# Patient Record
Sex: MALE | Race: WHITE | NOT HISPANIC OR LATINO | Employment: OTHER | URBAN - NONMETROPOLITAN AREA
[De-identification: names, ages, dates, MRNs, and addresses within clinical notes are randomized per-mention and may not be internally consistent; named-entity substitution may affect disease eponyms.]

---

## 2023-01-01 ENCOUNTER — APPOINTMENT (OUTPATIENT)
Dept: PHYSICAL THERAPY | Facility: OTHER | Age: 88
DRG: 193 | End: 2023-01-01
Payer: MEDICARE

## 2023-01-01 ENCOUNTER — APPOINTMENT (OUTPATIENT)
Dept: OCCUPATIONAL THERAPY | Facility: OTHER | Age: 88
DRG: 193 | End: 2023-01-01
Payer: MEDICARE

## 2023-01-01 ENCOUNTER — HOSPITAL ENCOUNTER (EMERGENCY)
Facility: OTHER | Age: 88
Discharge: HOME | End: 2023-12-07
Attending: PHYSICIAN ASSISTANT | Admitting: PHYSICIAN ASSISTANT
Payer: MEDICARE

## 2023-01-01 ENCOUNTER — APPOINTMENT (OUTPATIENT)
Dept: GENERAL RADIOLOGY | Facility: OTHER | Age: 88
DRG: 193 | End: 2023-01-01
Attending: EMERGENCY MEDICINE
Payer: MEDICARE

## 2023-01-01 ENCOUNTER — APPOINTMENT (OUTPATIENT)
Dept: OCCUPATIONAL THERAPY | Facility: OTHER | Age: 88
DRG: 193 | End: 2023-01-01
Attending: INTERNAL MEDICINE
Payer: MEDICARE

## 2023-01-01 ENCOUNTER — HOSPITAL ENCOUNTER (EMERGENCY)
Facility: OTHER | Age: 88
Discharge: NURSING, REHAB OR LONG-TERM CARE | End: 2023-12-11
Attending: INTERNAL MEDICINE | Admitting: INTERNAL MEDICINE
Payer: MEDICARE

## 2023-01-01 ENCOUNTER — HOSPITAL ENCOUNTER (INPATIENT)
Facility: OTHER | Age: 88
LOS: 5 days | Discharge: HOME HEALTH | DRG: 193 | End: 2023-12-30
Attending: STUDENT IN AN ORGANIZED HEALTH CARE EDUCATION/TRAINING PROGRAM | Admitting: INTERNAL MEDICINE
Payer: MEDICARE

## 2023-01-01 ENCOUNTER — APPOINTMENT (OUTPATIENT)
Dept: PHYSICAL THERAPY | Facility: OTHER | Age: 88
DRG: 193 | End: 2023-01-01
Attending: INTERNAL MEDICINE
Payer: MEDICARE

## 2023-01-01 ENCOUNTER — APPOINTMENT (OUTPATIENT)
Dept: CT IMAGING | Facility: OTHER | Age: 88
End: 2023-01-01
Attending: PHYSICIAN ASSISTANT
Payer: MEDICARE

## 2023-01-01 ENCOUNTER — APPOINTMENT (OUTPATIENT)
Dept: GENERAL RADIOLOGY | Facility: OTHER | Age: 88
DRG: 193 | End: 2023-01-01
Attending: INTERNAL MEDICINE
Payer: MEDICARE

## 2023-01-01 VITALS
SYSTOLIC BLOOD PRESSURE: 127 MMHG | TEMPERATURE: 98.7 F | OXYGEN SATURATION: 94 % | DIASTOLIC BLOOD PRESSURE: 65 MMHG | HEART RATE: 64 BPM | WEIGHT: 224 LBS | RESPIRATION RATE: 20 BRPM

## 2023-01-01 VITALS
SYSTOLIC BLOOD PRESSURE: 157 MMHG | OXYGEN SATURATION: 91 % | BODY MASS INDEX: 28.63 KG/M2 | HEIGHT: 68 IN | WEIGHT: 188.9 LBS | RESPIRATION RATE: 16 BRPM | HEART RATE: 65 BPM | TEMPERATURE: 97.8 F | DIASTOLIC BLOOD PRESSURE: 61 MMHG

## 2023-01-01 VITALS
DIASTOLIC BLOOD PRESSURE: 44 MMHG | RESPIRATION RATE: 18 BRPM | SYSTOLIC BLOOD PRESSURE: 115 MMHG | TEMPERATURE: 96 F | HEART RATE: 65 BPM | WEIGHT: 224 LBS | OXYGEN SATURATION: 97 %

## 2023-01-01 DIAGNOSIS — Z51.5 COMFORT MEASURES ONLY STATUS: ICD-10-CM

## 2023-01-01 DIAGNOSIS — I21.4 NSTEMI (NON-ST ELEVATED MYOCARDIAL INFARCTION) (H): ICD-10-CM

## 2023-01-01 DIAGNOSIS — S00.431A HEMATOMA OF RIGHT EAR, INITIAL ENCOUNTER: ICD-10-CM
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
## 2023-01-01 DIAGNOSIS — U07.1 COVID-19 VIRUS INFECTION: Primary | ICD-10-CM

## 2023-01-01 DIAGNOSIS — J18.9 PNEUMONIA OF RIGHT LOWER LOBE DUE TO INFECTIOUS ORGANISM: ICD-10-CM

## 2023-01-01 DIAGNOSIS — I48.20 CHRONIC ATRIAL FIBRILLATION (H): ICD-10-CM

## 2023-01-01 DIAGNOSIS — D64.9 ANEMIA, UNSPECIFIED TYPE: ICD-10-CM

## 2023-01-01 DIAGNOSIS — U07.1 COVID-19 VIRUS INFECTION: ICD-10-CM

## 2023-01-01 LAB
ABO/RH(D): NORMAL
ALBUMIN SERPL BCG-MCNC: 3 G/DL (ref 3.5–5.2)
ALBUMIN UR-MCNC: 30 MG/DL
ALP SERPL-CCNC: 83 U/L (ref 40–150)
ALT SERPL W P-5'-P-CCNC: 17 U/L (ref 0–70)
ANION GAP SERPL CALCULATED.3IONS-SCNC: 10 MMOL/L (ref 7–15)
ANION GAP SERPL CALCULATED.3IONS-SCNC: 10 MMOL/L (ref 7–15)
ANION GAP SERPL CALCULATED.3IONS-SCNC: 11 MMOL/L (ref 7–15)
ANION GAP SERPL CALCULATED.3IONS-SCNC: 11 MMOL/L (ref 7–15)
ANION GAP SERPL CALCULATED.3IONS-SCNC: 9 MMOL/L (ref 7–15)
ANION GAP SERPL CALCULATED.3IONS-SCNC: 9 MMOL/L (ref 7–15)
ANTIBODY SCREEN: NEGATIVE
APPEARANCE UR: ABNORMAL
AST SERPL W P-5'-P-CCNC: 32 U/L (ref 0–45)
ATRIAL RATE - MUSE: 76 BPM
BACTERIA UR CULT: NORMAL
BASE EXCESS BLDV CALC-SCNC: 2.1 MMOL/L (ref -7.7–1.9)
BASOPHILS # BLD AUTO: 0 10E3/UL (ref 0–0.2)
BASOPHILS # BLD AUTO: 0 10E3/UL (ref 0–0.2)
BASOPHILS NFR BLD AUTO: 0 %
BASOPHILS NFR BLD AUTO: 0 %
BILIRUB DIRECT SERPL-MCNC: <0.2 MG/DL (ref 0–0.3)
BILIRUB SERPL-MCNC: 0.5 MG/DL
BILIRUB UR QL STRIP: NEGATIVE
BLD PROD TYP BPU: NORMAL
BLOOD COMPONENT TYPE: NORMAL
BUN SERPL-MCNC: 25 MG/DL (ref 8–23)
BUN SERPL-MCNC: 29.4 MG/DL (ref 8–23)
BUN SERPL-MCNC: 32.6 MG/DL (ref 8–23)
BUN SERPL-MCNC: 33.1 MG/DL (ref 8–23)
BUN SERPL-MCNC: 34 MG/DL (ref 8–23)
BUN SERPL-MCNC: 36.7 MG/DL (ref 8–23)
CALCIUM SERPL-MCNC: 7.3 MG/DL (ref 8.2–9.6)
CALCIUM SERPL-MCNC: 7.8 MG/DL (ref 8.2–9.6)
CALCIUM SERPL-MCNC: 7.9 MG/DL (ref 8.2–9.6)
CALCIUM SERPL-MCNC: 8.1 MG/DL (ref 8.2–9.6)
CALCIUM SERPL-MCNC: 8.2 MG/DL (ref 8.2–9.6)
CALCIUM SERPL-MCNC: 8.6 MG/DL (ref 8.2–9.6)
CHLORIDE SERPL-SCNC: 101 MMOL/L (ref 98–107)
CHLORIDE SERPL-SCNC: 101 MMOL/L (ref 98–107)
CHLORIDE SERPL-SCNC: 102 MMOL/L (ref 98–107)
CHLORIDE SERPL-SCNC: 103 MMOL/L (ref 98–107)
CHLORIDE SERPL-SCNC: 99 MMOL/L (ref 98–107)
CHLORIDE SERPL-SCNC: 99 MMOL/L (ref 98–107)
CODING SYSTEM: NORMAL
COLOR UR AUTO: YELLOW
CREAT SERPL-MCNC: 1.23 MG/DL (ref 0.67–1.17)
CREAT SERPL-MCNC: 1.31 MG/DL (ref 0.67–1.17)
CREAT SERPL-MCNC: 1.34 MG/DL (ref 0.67–1.17)
CREAT SERPL-MCNC: 1.38 MG/DL (ref 0.67–1.17)
CREAT SERPL-MCNC: 1.4 MG/DL (ref 0.67–1.17)
CREAT SERPL-MCNC: 1.59 MG/DL (ref 0.67–1.17)
CROSSMATCH: NORMAL
CRP SERPL-MCNC: 121.08 MG/L
CRP SERPL-MCNC: 28.45 MG/L
CRP SERPL-MCNC: 45.93 MG/L
CRP SERPL-MCNC: 95.09 MG/L
DEPRECATED HCO3 PLAS-SCNC: 26 MMOL/L (ref 22–29)
DEPRECATED HCO3 PLAS-SCNC: 28 MMOL/L (ref 22–29)
DEPRECATED HCO3 PLAS-SCNC: 28 MMOL/L (ref 22–29)
DIASTOLIC BLOOD PRESSURE - MUSE: NORMAL MMHG
EGFRCR SERPLBLD CKD-EPI 2021: 41 ML/MIN/1.73M2
EGFRCR SERPLBLD CKD-EPI 2021: 47 ML/MIN/1.73M2
EGFRCR SERPLBLD CKD-EPI 2021: 48 ML/MIN/1.73M2
EGFRCR SERPLBLD CKD-EPI 2021: 50 ML/MIN/1.73M2
EGFRCR SERPLBLD CKD-EPI 2021: 51 ML/MIN/1.73M2
EGFRCR SERPLBLD CKD-EPI 2021: 55 ML/MIN/1.73M2
EOSINOPHIL # BLD AUTO: 0 10E3/UL (ref 0–0.7)
EOSINOPHIL # BLD AUTO: 0.3 10E3/UL (ref 0–0.7)
EOSINOPHIL NFR BLD AUTO: 0 %
EOSINOPHIL NFR BLD AUTO: 2 %
ERYTHROCYTE [DISTWIDTH] IN BLOOD BY AUTOMATED COUNT: 17.1 % (ref 10–15)
ERYTHROCYTE [DISTWIDTH] IN BLOOD BY AUTOMATED COUNT: 17.2 % (ref 10–15)
ERYTHROCYTE [DISTWIDTH] IN BLOOD BY AUTOMATED COUNT: 17.5 % (ref 10–15)
ERYTHROCYTE [DISTWIDTH] IN BLOOD BY AUTOMATED COUNT: 18.4 % (ref 10–15)
FERRITIN SERPL-MCNC: 53 NG/ML (ref 31–409)
GLUCOSE BLDC GLUCOMTR-MCNC: 100 MG/DL (ref 70–99)
GLUCOSE BLDC GLUCOMTR-MCNC: 126 MG/DL (ref 70–99)
GLUCOSE BLDC GLUCOMTR-MCNC: 126 MG/DL (ref 70–99)
GLUCOSE BLDC GLUCOMTR-MCNC: 148 MG/DL (ref 70–99)
GLUCOSE BLDC GLUCOMTR-MCNC: 153 MG/DL (ref 70–99)
GLUCOSE BLDC GLUCOMTR-MCNC: 154 MG/DL (ref 70–99)
GLUCOSE BLDC GLUCOMTR-MCNC: 159 MG/DL (ref 70–99)
GLUCOSE BLDC GLUCOMTR-MCNC: 167 MG/DL (ref 70–99)
GLUCOSE BLDC GLUCOMTR-MCNC: 168 MG/DL (ref 70–99)
GLUCOSE BLDC GLUCOMTR-MCNC: 176 MG/DL (ref 70–99)
GLUCOSE BLDC GLUCOMTR-MCNC: 182 MG/DL (ref 70–99)
GLUCOSE BLDC GLUCOMTR-MCNC: 202 MG/DL (ref 70–99)
GLUCOSE BLDC GLUCOMTR-MCNC: 217 MG/DL (ref 70–99)
GLUCOSE BLDC GLUCOMTR-MCNC: 272 MG/DL (ref 70–99)
GLUCOSE BLDC GLUCOMTR-MCNC: 318 MG/DL (ref 70–99)
GLUCOSE BLDC GLUCOMTR-MCNC: 79 MG/DL (ref 70–99)
GLUCOSE BLDC GLUCOMTR-MCNC: 80 MG/DL (ref 70–99)
GLUCOSE BLDC GLUCOMTR-MCNC: 82 MG/DL (ref 70–99)
GLUCOSE BLDC GLUCOMTR-MCNC: 86 MG/DL (ref 70–99)
GLUCOSE SERPL-MCNC: 165 MG/DL (ref 70–99)
GLUCOSE SERPL-MCNC: 189 MG/DL (ref 70–99)
GLUCOSE SERPL-MCNC: 192 MG/DL (ref 70–99)
GLUCOSE SERPL-MCNC: 319 MG/DL (ref 70–99)
GLUCOSE SERPL-MCNC: 92 MG/DL (ref 70–99)
GLUCOSE SERPL-MCNC: 93 MG/DL (ref 70–99)
GLUCOSE UR STRIP-MCNC: NEGATIVE MG/DL
HBA1C MFR BLD: 5.9 % (ref 4–6.2)
HCO3 BLDV-SCNC: 27 MMOL/L (ref 21–28)
HCT VFR BLD AUTO: 19.1 % (ref 40–53)
HCT VFR BLD AUTO: 25.4 % (ref 40–53)
HCT VFR BLD AUTO: 26 % (ref 40–53)
HCT VFR BLD AUTO: 26.2 % (ref 40–53)
HCT VFR BLD AUTO: 28.7 % (ref 40–53)
HEMOCCULT STL QL: POSITIVE
HGB BLD-MCNC: 5.6 G/DL (ref 13.3–17.7)
HGB BLD-MCNC: 7.7 G/DL (ref 13.3–17.7)
HGB BLD-MCNC: 7.7 G/DL (ref 13.3–17.7)
HGB BLD-MCNC: 7.9 G/DL (ref 13.3–17.7)
HGB BLD-MCNC: 8 G/DL (ref 13.3–17.7)
HGB BLD-MCNC: 8.3 G/DL (ref 13.3–17.7)
HGB BLD-MCNC: 8.4 G/DL (ref 13.3–17.7)
HGB BLD-MCNC: 8.5 G/DL (ref 13.3–17.7)
HGB BLD-MCNC: 8.7 G/DL (ref 13.3–17.7)
HGB BLD-MCNC: 8.8 G/DL (ref 13.3–17.7)
HGB UR QL STRIP: NEGATIVE
HOLD SPECIMEN: NORMAL
HYALINE CASTS: 1 /LPF
IMM GRANULOCYTES # BLD: 0.1 10E3/UL
IMM GRANULOCYTES # BLD: 0.1 10E3/UL
IMM GRANULOCYTES NFR BLD: 1 %
IMM GRANULOCYTES NFR BLD: 1 %
INR PPP: 1.69 (ref 0.85–1.15)
INTERPRETATION ECG - MUSE: NORMAL
IRON BINDING CAPACITY (ROCHE): 247 UG/DL (ref 240–430)
IRON SATN MFR SERPL: 6 % (ref 15–46)
IRON SERPL-MCNC: 16 UG/DL (ref 61–157)
ISSUE DATE AND TIME: NORMAL
KETONES UR STRIP-MCNC: NEGATIVE MG/DL
L PNEUMO1 AG UR QL IA: NEGATIVE
LACTATE SERPL-SCNC: 0.7 MMOL/L (ref 0.7–2)
LACTATE SERPL-SCNC: 1.1 MMOL/L (ref 0.7–2)
LEUKOCYTE ESTERASE UR QL STRIP: ABNORMAL
LYMPHOCYTES # BLD AUTO: 1.4 10E3/UL (ref 0.8–5.3)
LYMPHOCYTES # BLD AUTO: 2.2 10E3/UL (ref 0.8–5.3)
LYMPHOCYTES NFR BLD AUTO: 11 %
LYMPHOCYTES NFR BLD AUTO: 14 %
MCH RBC QN AUTO: 23.6 PG (ref 26.5–33)
MCH RBC QN AUTO: 24.7 PG (ref 26.5–33)
MCH RBC QN AUTO: 25 PG (ref 26.5–33)
MCH RBC QN AUTO: 25.2 PG (ref 26.5–33)
MCHC RBC AUTO-ENTMCNC: 29.3 G/DL (ref 31.5–36.5)
MCHC RBC AUTO-ENTMCNC: 29.6 G/DL (ref 31.5–36.5)
MCHC RBC AUTO-ENTMCNC: 30.7 G/DL (ref 31.5–36.5)
MCHC RBC AUTO-ENTMCNC: 31.1 G/DL (ref 31.5–36.5)
MCV RBC AUTO: 81 FL (ref 78–100)
MCV RBC AUTO: 81 FL (ref 78–100)
MCV RBC AUTO: 82 FL (ref 78–100)
MCV RBC AUTO: 83 FL (ref 78–100)
MONOCYTES # BLD AUTO: 1 10E3/UL (ref 0–1.3)
MONOCYTES # BLD AUTO: 1.9 10E3/UL (ref 0–1.3)
MONOCYTES NFR BLD AUTO: 12 %
MONOCYTES NFR BLD AUTO: 8 %
NEUTROPHILS # BLD AUTO: 10.7 10E3/UL (ref 1.6–8.3)
NEUTROPHILS # BLD AUTO: 11.2 10E3/UL (ref 1.6–8.3)
NEUTROPHILS NFR BLD AUTO: 71 %
NEUTROPHILS NFR BLD AUTO: 80 %
NITRATE UR QL: NEGATIVE
NRBC # BLD AUTO: 0.1 10E3/UL
NRBC # BLD AUTO: 0.1 10E3/UL
NRBC BLD AUTO-RTO: 0 /100
NRBC BLD AUTO-RTO: 0 /100
O2/TOTAL GAS SETTING VFR VENT: 28 %
P AXIS - MUSE: NORMAL DEGREES
PCO2 BLDV: 44 MM HG (ref 40–50)
PH BLDV: 7.4 [PH] (ref 7.32–7.43)
PH UR STRIP: 7.5 [PH] (ref 5–9)
PLATELET # BLD AUTO: 403 10E3/UL (ref 150–450)
PLATELET # BLD AUTO: 410 10E3/UL (ref 150–450)
PLATELET # BLD AUTO: 419 10E3/UL (ref 150–450)
PLATELET # BLD AUTO: 423 10E3/UL (ref 150–450)
PO2 BLDV: 99 MM HG (ref 25–47)
POTASSIUM SERPL-SCNC: 4.2 MMOL/L (ref 3.4–5.3)
POTASSIUM SERPL-SCNC: 4.3 MMOL/L (ref 3.4–5.3)
POTASSIUM SERPL-SCNC: 4.3 MMOL/L (ref 3.4–5.3)
POTASSIUM SERPL-SCNC: 4.5 MMOL/L (ref 3.4–5.3)
POTASSIUM SERPL-SCNC: 4.6 MMOL/L (ref 3.4–5.3)
POTASSIUM SERPL-SCNC: 5 MMOL/L (ref 3.4–5.3)
PR INTERVAL - MUSE: NORMAL MS
PROCALCITONIN SERPL IA-MCNC: 0.32 NG/ML
PROCALCITONIN SERPL IA-MCNC: 0.33 NG/ML
PROCALCITONIN SERPL IA-MCNC: 0.37 NG/ML
PROT SERPL-MCNC: 5.7 G/DL (ref 6.4–8.3)
QRS DURATION - MUSE: 100 MS
QT - MUSE: 420 MS
QTC - MUSE: 446 MS
R AXIS - MUSE: -2 DEGREES
RBC # BLD AUTO: 2.37 10E6/UL (ref 4.4–5.9)
RBC # BLD AUTO: 3.12 10E6/UL (ref 4.4–5.9)
RBC # BLD AUTO: 3.13 10E6/UL (ref 4.4–5.9)
RBC # BLD AUTO: 3.52 10E6/UL (ref 4.4–5.9)
RBC URINE: 2 /HPF
S PNEUM AG SPEC QL: NEGATIVE
SODIUM SERPL-SCNC: 136 MMOL/L (ref 135–145)
SODIUM SERPL-SCNC: 136 MMOL/L (ref 135–145)
SODIUM SERPL-SCNC: 137 MMOL/L (ref 135–145)
SODIUM SERPL-SCNC: 138 MMOL/L (ref 135–145)
SODIUM SERPL-SCNC: 139 MMOL/L (ref 135–145)
SODIUM SERPL-SCNC: 139 MMOL/L (ref 135–145)
SP GR UR STRIP: 1.02 (ref 1–1.03)
SPECIMEN EXPIRATION DATE: NORMAL
SYSTOLIC BLOOD PRESSURE - MUSE: NORMAL MMHG
T AXIS - MUSE: 20 DEGREES
TROPONIN T SERPL HS-MCNC: 67 NG/L
TROPONIN T SERPL HS-MCNC: 70 NG/L
TROPONIN T SERPL HS-MCNC: 70 NG/L
UNIT ABO/RH: NORMAL
UNIT NUMBER: NORMAL
UNIT STATUS: NORMAL
UNIT TYPE ISBT: 6200
UROBILINOGEN UR STRIP-MCNC: NORMAL MG/DL
VENTRICULAR RATE- MUSE: 68 BPM
WBC # BLD AUTO: 13.2 10E3/UL (ref 4–11)
WBC # BLD AUTO: 14.4 10E3/UL (ref 4–11)
WBC # BLD AUTO: 15.7 10E3/UL (ref 4–11)
WBC # BLD AUTO: 9.5 10E3/UL (ref 4–11)
WBC URINE: 136 /HPF
YEAST #/AREA URNS HPF: ABNORMAL /HPF

## 2023-01-01 PROCEDURE — 84145 PROCALCITONIN (PCT): CPT | Performed by: INTERNAL MEDICINE

## 2023-01-01 PROCEDURE — 80048 BASIC METABOLIC PNL TOTAL CA: CPT | Performed by: EMERGENCY MEDICINE

## 2023-01-01 PROCEDURE — 250N000011 HC RX IP 250 OP 636: Performed by: INTERNAL MEDICINE

## 2023-01-01 PROCEDURE — 250N000009 HC RX 250: Performed by: INTERNAL MEDICINE

## 2023-01-01 PROCEDURE — 99284 EMERGENCY DEPT VISIT MOD MDM: CPT | Performed by: PHYSICIAN ASSISTANT

## 2023-01-01 PROCEDURE — 97116 GAIT TRAINING THERAPY: CPT | Mod: GP

## 2023-01-01 PROCEDURE — 86140 C-REACTIVE PROTEIN: CPT | Performed by: INTERNAL MEDICINE

## 2023-01-01 PROCEDURE — 250N000013 HC RX MED GY IP 250 OP 250 PS 637: Performed by: INTERNAL MEDICINE

## 2023-01-01 PROCEDURE — 99283 EMERGENCY DEPT VISIT LOW MDM: CPT | Performed by: INTERNAL MEDICINE

## 2023-01-01 PROCEDURE — 82272 OCCULT BLD FECES 1-3 TESTS: CPT | Performed by: EMERGENCY MEDICINE

## 2023-01-01 PROCEDURE — 94640 AIRWAY INHALATION TREATMENT: CPT | Mod: 76

## 2023-01-01 PROCEDURE — 99285 EMERGENCY DEPT VISIT HI MDM: CPT | Performed by: EMERGENCY MEDICINE

## 2023-01-01 PROCEDURE — P9016 RBC LEUKOCYTES REDUCED: HCPCS | Performed by: INTERNAL MEDICINE

## 2023-01-01 PROCEDURE — 120N000001 HC R&B MED SURG/OB

## 2023-01-01 PROCEDURE — 99284 EMERGENCY DEPT VISIT MOD MDM: CPT | Mod: 25

## 2023-01-01 PROCEDURE — 84484 ASSAY OF TROPONIN QUANT: CPT | Performed by: EMERGENCY MEDICINE

## 2023-01-01 PROCEDURE — 258N000003 HC RX IP 258 OP 636: Performed by: INTERNAL MEDICINE

## 2023-01-01 PROCEDURE — 30233N1 TRANSFUSION OF NONAUTOLOGOUS RED BLOOD CELLS INTO PERIPHERAL VEIN, PERCUTANEOUS APPROACH: ICD-10-PCS | Performed by: EMERGENCY MEDICINE

## 2023-01-01 PROCEDURE — 87086 URINE CULTURE/COLONY COUNT: CPT | Performed by: INTERNAL MEDICINE

## 2023-01-01 PROCEDURE — P9016 RBC LEUKOCYTES REDUCED: HCPCS | Performed by: EMERGENCY MEDICINE

## 2023-01-01 PROCEDURE — 85018 HEMOGLOBIN: CPT | Performed by: INTERNAL MEDICINE

## 2023-01-01 PROCEDURE — 36415 COLL VENOUS BLD VENIPUNCTURE: CPT | Performed by: INTERNAL MEDICINE

## 2023-01-01 PROCEDURE — 97535 SELF CARE MNGMENT TRAINING: CPT | Mod: GO | Performed by: OCCUPATIONAL THERAPIST

## 2023-01-01 PROCEDURE — 999N000157 HC STATISTIC RCP TIME EA 10 MIN

## 2023-01-01 PROCEDURE — 85014 HEMATOCRIT: CPT | Performed by: INTERNAL MEDICINE

## 2023-01-01 PROCEDURE — 97530 THERAPEUTIC ACTIVITIES: CPT | Mod: GP

## 2023-01-01 PROCEDURE — 83036 HEMOGLOBIN GLYCOSYLATED A1C: CPT | Performed by: INTERNAL MEDICINE

## 2023-01-01 PROCEDURE — 80048 BASIC METABOLIC PNL TOTAL CA: CPT | Performed by: INTERNAL MEDICINE

## 2023-01-01 PROCEDURE — G1010 CDSM STANSON: HCPCS

## 2023-01-01 PROCEDURE — 83550 IRON BINDING TEST: CPT | Performed by: INTERNAL MEDICINE

## 2023-01-01 PROCEDURE — 71045 X-RAY EXAM CHEST 1 VIEW: CPT | Mod: TC

## 2023-01-01 PROCEDURE — 99232 SBSQ HOSP IP/OBS MODERATE 35: CPT | Performed by: INTERNAL MEDICINE

## 2023-01-01 PROCEDURE — 71045 X-RAY EXAM CHEST 1 VIEW: CPT

## 2023-01-01 PROCEDURE — 94640 AIRWAY INHALATION TREATMENT: CPT

## 2023-01-01 PROCEDURE — 36430 TRANSFUSION BLD/BLD COMPNT: CPT | Performed by: EMERGENCY MEDICINE

## 2023-01-01 PROCEDURE — 97530 THERAPEUTIC ACTIVITIES: CPT | Mod: GO | Performed by: OCCUPATIONAL THERAPIST

## 2023-01-01 PROCEDURE — 93010 ELECTROCARDIOGRAM REPORT: CPT | Performed by: INTERNAL MEDICINE

## 2023-01-01 PROCEDURE — 83605 ASSAY OF LACTIC ACID: CPT | Performed by: INTERNAL MEDICINE

## 2023-01-01 PROCEDURE — 250N000012 HC RX MED GY IP 250 OP 636 PS 637: Performed by: INTERNAL MEDICINE

## 2023-01-01 PROCEDURE — 96365 THER/PROPH/DIAG IV INF INIT: CPT | Performed by: EMERGENCY MEDICINE

## 2023-01-01 PROCEDURE — 94799 UNLISTED PULMONARY SVC/PX: CPT

## 2023-01-01 PROCEDURE — 85610 PROTHROMBIN TIME: CPT | Performed by: INTERNAL MEDICINE

## 2023-01-01 PROCEDURE — 99207 PR NO CHARGE LOS: CPT | Performed by: INTERNAL MEDICINE

## 2023-01-01 PROCEDURE — 93005 ELECTROCARDIOGRAM TRACING: CPT | Performed by: EMERGENCY MEDICINE

## 2023-01-01 PROCEDURE — 99223 1ST HOSP IP/OBS HIGH 75: CPT | Performed by: INTERNAL MEDICINE

## 2023-01-01 PROCEDURE — 99239 HOSP IP/OBS DSCHRG MGMT >30: CPT | Performed by: INTERNAL MEDICINE

## 2023-01-01 PROCEDURE — 83605 ASSAY OF LACTIC ACID: CPT | Mod: 91 | Performed by: INTERNAL MEDICINE

## 2023-01-01 PROCEDURE — 87899 AGENT NOS ASSAY W/OPTIC: CPT | Performed by: INTERNAL MEDICINE

## 2023-01-01 PROCEDURE — 250N000011 HC RX IP 250 OP 636: Performed by: EMERGENCY MEDICINE

## 2023-01-01 PROCEDURE — 97161 PT EVAL LOW COMPLEX 20 MIN: CPT | Mod: GP

## 2023-01-01 PROCEDURE — 85025 COMPLETE CBC W/AUTO DIFF WBC: CPT | Performed by: EMERGENCY MEDICINE

## 2023-01-01 PROCEDURE — 36415 COLL VENOUS BLD VENIPUNCTURE: CPT | Performed by: EMERGENCY MEDICINE

## 2023-01-01 PROCEDURE — 250N000011 HC RX IP 250 OP 636: Mod: JZ | Performed by: INTERNAL MEDICINE

## 2023-01-01 PROCEDURE — 97165 OT EVAL LOW COMPLEX 30 MIN: CPT | Mod: GO | Performed by: OCCUPATIONAL THERAPIST

## 2023-01-01 PROCEDURE — 85027 COMPLETE CBC AUTOMATED: CPT | Performed by: INTERNAL MEDICINE

## 2023-01-01 PROCEDURE — 86900 BLOOD TYPING SEROLOGIC ABO: CPT | Performed by: EMERGENCY MEDICINE

## 2023-01-01 PROCEDURE — 82803 BLOOD GASES ANY COMBINATION: CPT | Performed by: INTERNAL MEDICINE

## 2023-01-01 PROCEDURE — 86923 COMPATIBILITY TEST ELECTRIC: CPT | Mod: 91 | Performed by: EMERGENCY MEDICINE

## 2023-01-01 PROCEDURE — 85025 COMPLETE CBC W/AUTO DIFF WBC: CPT | Performed by: INTERNAL MEDICINE

## 2023-01-01 PROCEDURE — 80053 COMPREHEN METABOLIC PANEL: CPT | Performed by: INTERNAL MEDICINE

## 2023-01-01 PROCEDURE — 86923 COMPATIBILITY TEST ELECTRIC: CPT | Mod: 91 | Performed by: INTERNAL MEDICINE

## 2023-01-01 PROCEDURE — 81001 URINALYSIS AUTO W/SCOPE: CPT | Performed by: INTERNAL MEDICINE

## 2023-01-01 PROCEDURE — 99282 EMERGENCY DEPT VISIT SF MDM: CPT | Performed by: INTERNAL MEDICINE

## 2023-01-01 PROCEDURE — 99285 EMERGENCY DEPT VISIT HI MDM: CPT | Mod: 25 | Performed by: EMERGENCY MEDICINE

## 2023-01-01 PROCEDURE — 82248 BILIRUBIN DIRECT: CPT | Performed by: INTERNAL MEDICINE

## 2023-01-01 PROCEDURE — 84484 ASSAY OF TROPONIN QUANT: CPT | Performed by: INTERNAL MEDICINE

## 2023-01-01 PROCEDURE — 82728 ASSAY OF FERRITIN: CPT | Performed by: INTERNAL MEDICINE

## 2023-01-01 RX ORDER — ABIRATERONE ACETATE 250 MG/1
1000 TABLET ORAL DAILY
Status: DISCONTINUED | OUTPATIENT
Start: 2023-01-01 | End: 2023-01-01 | Stop reason: HOSPADM

## 2023-01-01 RX ORDER — ONDANSETRON 2 MG/ML
4 INJECTION INTRAMUSCULAR; INTRAVENOUS EVERY 6 HOURS PRN
Status: DISCONTINUED | OUTPATIENT
Start: 2023-01-01 | End: 2023-01-01 | Stop reason: HOSPADM

## 2023-01-01 RX ORDER — MORPHINE SULFATE 2 MG/ML
2 INJECTION, SOLUTION INTRAMUSCULAR; INTRAVENOUS ONCE
Status: COMPLETED | OUTPATIENT
Start: 2023-01-01 | End: 2023-01-01

## 2023-01-01 RX ORDER — LIDOCAINE 40 MG/G
CREAM TOPICAL
Status: DISCONTINUED | OUTPATIENT
Start: 2023-01-01 | End: 2023-01-01 | Stop reason: HOSPADM

## 2023-01-01 RX ORDER — CALCIUM CARBONATE 500 MG/1
1000 TABLET, CHEWABLE ORAL 4 TIMES DAILY PRN
Status: DISCONTINUED | OUTPATIENT
Start: 2023-01-01 | End: 2023-01-01 | Stop reason: HOSPADM

## 2023-01-01 RX ORDER — VIT C/E/ZN/COPPR/LUTEIN/ZEAXAN 60 MG-6 MG
1 CAPSULE ORAL DAILY
Status: ON HOLD | COMMUNITY
End: 2024-01-01

## 2023-01-01 RX ORDER — ONDANSETRON 4 MG/1
4 TABLET, ORALLY DISINTEGRATING ORAL EVERY 6 HOURS PRN
Status: DISCONTINUED | OUTPATIENT
Start: 2023-01-01 | End: 2023-01-01 | Stop reason: HOSPADM

## 2023-01-01 RX ORDER — ASCORBIC ACID 500 MG
500 TABLET ORAL 2 TIMES DAILY
Status: DISCONTINUED | OUTPATIENT
Start: 2023-01-01 | End: 2023-01-01 | Stop reason: HOSPADM

## 2023-01-01 RX ORDER — DEXTROSE MONOHYDRATE 25 G/50ML
25-50 INJECTION, SOLUTION INTRAVENOUS
Status: DISCONTINUED | OUTPATIENT
Start: 2023-01-01 | End: 2023-01-01 | Stop reason: HOSPADM

## 2023-01-01 RX ORDER — AZITHROMYCIN 500 MG/5ML
500 INJECTION, POWDER, LYOPHILIZED, FOR SOLUTION INTRAVENOUS EVERY 24 HOURS
Status: DISCONTINUED | OUTPATIENT
Start: 2023-01-01 | End: 2023-01-01 | Stop reason: HOSPADM

## 2023-01-01 RX ORDER — ACETAMINOPHEN 325 MG/1
650 TABLET ORAL EVERY 4 HOURS PRN
Status: DISCONTINUED | OUTPATIENT
Start: 2023-01-01 | End: 2023-01-01 | Stop reason: HOSPADM

## 2023-01-01 RX ORDER — IPRATROPIUM BROMIDE AND ALBUTEROL SULFATE 2.5; .5 MG/3ML; MG/3ML
1 SOLUTION RESPIRATORY (INHALATION) EVERY 6 HOURS PRN
COMMUNITY

## 2023-01-01 RX ORDER — AMOXICILLIN 250 MG
1 CAPSULE ORAL 2 TIMES DAILY PRN
Status: DISCONTINUED | OUTPATIENT
Start: 2023-01-01 | End: 2023-01-01 | Stop reason: HOSPADM

## 2023-01-01 RX ORDER — PANTOPRAZOLE SODIUM 40 MG/1
40 TABLET, DELAYED RELEASE ORAL 2 TIMES DAILY
Qty: 60 TABLET | Refills: 0 | Status: SHIPPED | OUTPATIENT
Start: 2023-01-01 | End: 2024-01-01

## 2023-01-01 RX ORDER — LIDOCAINE 4 G/G
1 PATCH TOPICAL EVERY 24 HOURS
Status: DISCONTINUED | OUTPATIENT
Start: 2023-01-01 | End: 2023-01-01 | Stop reason: HOSPADM

## 2023-01-01 RX ORDER — CEFTRIAXONE 1 G/1
1 INJECTION, POWDER, FOR SOLUTION INTRAMUSCULAR; INTRAVENOUS ONCE
Status: COMPLETED | OUTPATIENT
Start: 2023-01-01 | End: 2023-01-01

## 2023-01-01 RX ORDER — DEXAMETHASONE SODIUM PHOSPHATE 4 MG/ML
6 INJECTION, SOLUTION INTRA-ARTICULAR; INTRALESIONAL; INTRAMUSCULAR; INTRAVENOUS; SOFT TISSUE 2 TIMES DAILY
Status: DISCONTINUED | OUTPATIENT
Start: 2023-01-01 | End: 2023-01-01 | Stop reason: HOSPADM

## 2023-01-01 RX ORDER — ISOSORBIDE MONONITRATE 30 MG/1
30 TABLET, EXTENDED RELEASE ORAL DAILY
Status: DISCONTINUED | OUTPATIENT
Start: 2023-01-01 | End: 2023-01-01 | Stop reason: HOSPADM

## 2023-01-01 RX ORDER — VITAMIN B COMPLEX
50 TABLET ORAL DAILY
Status: DISCONTINUED | OUTPATIENT
Start: 2023-01-01 | End: 2023-01-01

## 2023-01-01 RX ORDER — GUAIFENESIN/DEXTROMETHORPHAN 100-10MG/5
10 SYRUP ORAL EVERY 4 HOURS PRN
Status: DISCONTINUED | OUTPATIENT
Start: 2023-01-01 | End: 2023-01-01 | Stop reason: HOSPADM

## 2023-01-01 RX ORDER — IPRATROPIUM BROMIDE AND ALBUTEROL SULFATE 2.5; .5 MG/3ML; MG/3ML
3 SOLUTION RESPIRATORY (INHALATION)
Status: DISCONTINUED | OUTPATIENT
Start: 2023-01-01 | End: 2023-01-01 | Stop reason: HOSPADM

## 2023-01-01 RX ORDER — PREDNISONE 5 MG/1
5 TABLET ORAL 2 TIMES DAILY
Status: DISCONTINUED | OUTPATIENT
Start: 2023-01-01 | End: 2023-01-01 | Stop reason: HOSPADM

## 2023-01-01 RX ORDER — FUROSEMIDE 10 MG/ML
20 INJECTION INTRAMUSCULAR; INTRAVENOUS ONCE
Status: COMPLETED | OUTPATIENT
Start: 2023-01-01 | End: 2023-01-01

## 2023-01-01 RX ORDER — CEFEPIME HYDROCHLORIDE 2 G/1
2 INJECTION, POWDER, FOR SOLUTION INTRAVENOUS EVERY 12 HOURS
Status: DISCONTINUED | OUTPATIENT
Start: 2023-01-01 | End: 2023-01-01 | Stop reason: HOSPADM

## 2023-01-01 RX ORDER — PREDNISONE 5 MG/1
5 TABLET ORAL DAILY
Status: ON HOLD | COMMUNITY
Start: 2024-01-01 | End: 2024-01-01

## 2023-01-01 RX ORDER — FLUTICASONE FUROATE AND VILANTEROL 100; 25 UG/1; UG/1
1 POWDER RESPIRATORY (INHALATION) DAILY
Status: DISCONTINUED | OUTPATIENT
Start: 2023-01-01 | End: 2023-01-01 | Stop reason: HOSPADM

## 2023-01-01 RX ORDER — ZINC SULFATE 50(220)MG
220 CAPSULE ORAL DAILY
Status: DISCONTINUED | OUTPATIENT
Start: 2023-01-01 | End: 2023-01-01 | Stop reason: HOSPADM

## 2023-01-01 RX ORDER — FUROSEMIDE 10 MG/ML
20 INJECTION INTRAMUSCULAR; INTRAVENOUS EVERY 12 HOURS
Status: DISCONTINUED | OUTPATIENT
Start: 2023-01-01 | End: 2023-01-01

## 2023-01-01 RX ORDER — DONEPEZIL HYDROCHLORIDE 5 MG/1
5 TABLET, FILM COATED ORAL AT BEDTIME
Status: DISCONTINUED | OUTPATIENT
Start: 2023-01-01 | End: 2023-01-01 | Stop reason: HOSPADM

## 2023-01-01 RX ORDER — PREDNISONE 5 MG/1
5 TABLET ORAL 2 TIMES DAILY
Status: ON HOLD | COMMUNITY
End: 2023-01-01

## 2023-01-01 RX ORDER — ALLOPURINOL 100 MG/1
100 TABLET ORAL DAILY
Status: DISCONTINUED | OUTPATIENT
Start: 2023-01-01 | End: 2023-01-01 | Stop reason: HOSPADM

## 2023-01-01 RX ORDER — LIDOCAINE HYDROCHLORIDE 10 MG/ML
5 INJECTION, SOLUTION INFILTRATION; PERINEURAL ONCE
Status: DISCONTINUED | OUTPATIENT
Start: 2023-01-01 | End: 2023-01-01 | Stop reason: HOSPADM

## 2023-01-01 RX ORDER — MELATONIN 3 MG
1.5 TABLET ORAL
Status: DISCONTINUED | OUTPATIENT
Start: 2023-01-01 | End: 2023-01-01 | Stop reason: HOSPADM

## 2023-01-01 RX ORDER — ISOSORBIDE MONONITRATE 30 MG/1
30 TABLET, EXTENDED RELEASE ORAL DAILY
COMMUNITY

## 2023-01-01 RX ORDER — AMOXICILLIN 250 MG
2 CAPSULE ORAL 2 TIMES DAILY PRN
Status: DISCONTINUED | OUTPATIENT
Start: 2023-01-01 | End: 2023-01-01 | Stop reason: HOSPADM

## 2023-01-01 RX ORDER — ALBUTEROL SULFATE 0.83 MG/ML
2.5 SOLUTION RESPIRATORY (INHALATION)
Status: DISCONTINUED | OUTPATIENT
Start: 2023-01-01 | End: 2023-01-01 | Stop reason: HOSPADM

## 2023-01-01 RX ORDER — CARVEDILOL 6.25 MG/1
6.25 TABLET ORAL 2 TIMES DAILY
Status: DISCONTINUED | OUTPATIENT
Start: 2023-01-01 | End: 2023-01-01 | Stop reason: HOSPADM

## 2023-01-01 RX ORDER — GUAIFENESIN 600 MG/1
600 TABLET, EXTENDED RELEASE ORAL 2 TIMES DAILY
Status: DISCONTINUED | OUTPATIENT
Start: 2023-01-01 | End: 2023-01-01 | Stop reason: HOSPADM

## 2023-01-01 RX ORDER — FUROSEMIDE 10 MG/ML
20 INJECTION INTRAMUSCULAR; INTRAVENOUS EVERY 24 HOURS
Status: DISCONTINUED | OUTPATIENT
Start: 2023-01-01 | End: 2023-01-01 | Stop reason: HOSPADM

## 2023-01-01 RX ORDER — PANTOPRAZOLE SODIUM 40 MG/1
40 TABLET, DELAYED RELEASE ORAL 2 TIMES DAILY
Status: DISCONTINUED | OUTPATIENT
Start: 2023-01-01 | End: 2023-01-01 | Stop reason: HOSPADM

## 2023-01-01 RX ORDER — CEFTRIAXONE 1 G/1
1 INJECTION, POWDER, FOR SOLUTION INTRAMUSCULAR; INTRAVENOUS EVERY 24 HOURS
Status: DISCONTINUED | OUTPATIENT
Start: 2023-01-01 | End: 2023-01-01

## 2023-01-01 RX ORDER — DEXAMETHASONE 6 MG/1
6 TABLET ORAL DAILY
Qty: 6 TABLET | Refills: 0 | Status: ON HOLD | OUTPATIENT
Start: 2023-01-01 | End: 2024-01-01

## 2023-01-01 RX ORDER — ALLOPURINOL 100 MG/1
100 TABLET ORAL 2 TIMES DAILY
Status: DISCONTINUED | OUTPATIENT
Start: 2023-01-01 | End: 2023-01-01

## 2023-01-01 RX ORDER — LIDOCAINE 50 MG/G
PATCH TOPICAL EVERY 24 HOURS
Status: ON HOLD | COMMUNITY
End: 2024-01-01

## 2023-01-01 RX ORDER — NICOTINE POLACRILEX 4 MG
15-30 LOZENGE BUCCAL
Status: DISCONTINUED | OUTPATIENT
Start: 2023-01-01 | End: 2023-01-01 | Stop reason: HOSPADM

## 2023-01-01 RX ORDER — GUAIFENESIN 600 MG/1
600 TABLET, EXTENDED RELEASE ORAL 2 TIMES DAILY
Qty: 12 TABLET | Refills: 0 | Status: SHIPPED | OUTPATIENT
Start: 2023-01-01 | End: 2024-01-01

## 2023-01-01 RX ORDER — CEFDINIR 300 MG/1
300 CAPSULE ORAL 2 TIMES DAILY
Qty: 10 CAPSULE | Refills: 0 | Status: SHIPPED | OUTPATIENT
Start: 2023-01-01 | End: 2024-01-01

## 2023-01-01 RX ORDER — IPRATROPIUM BROMIDE AND ALBUTEROL SULFATE 2.5; .5 MG/3ML; MG/3ML
3 SOLUTION RESPIRATORY (INHALATION) EVERY 4 HOURS PRN
Status: DISCONTINUED | OUTPATIENT
Start: 2023-01-01 | End: 2023-01-01

## 2023-01-01 RX ORDER — FUROSEMIDE 20 MG
20 TABLET ORAL DAILY
Status: DISCONTINUED | OUTPATIENT
Start: 2023-01-01 | End: 2023-01-01

## 2023-01-01 RX ORDER — FUROSEMIDE 20 MG
20 TABLET ORAL DAILY
Status: ON HOLD | COMMUNITY
End: 2024-01-01

## 2023-01-01 RX ORDER — DEXAMETHASONE SODIUM PHOSPHATE 4 MG/ML
6 INJECTION, SOLUTION INTRA-ARTICULAR; INTRALESIONAL; INTRAMUSCULAR; INTRAVENOUS; SOFT TISSUE EVERY 12 HOURS
Status: DISCONTINUED | OUTPATIENT
Start: 2023-01-01 | End: 2023-01-01

## 2023-01-01 RX ADMIN — CEFEPIME 2 G: 2 INJECTION, POWDER, FOR SOLUTION INTRAVENOUS at 13:08

## 2023-01-01 RX ADMIN — CARVEDILOL 6.25 MG: 6.25 TABLET, FILM COATED ORAL at 09:40

## 2023-01-01 RX ADMIN — AZITHROMYCIN MONOHYDRATE 500 MG: 500 INJECTION, POWDER, LYOPHILIZED, FOR SOLUTION INTRAVENOUS at 21:55

## 2023-01-01 RX ADMIN — ISOSORBIDE MONONITRATE 30 MG: 30 TABLET, EXTENDED RELEASE ORAL at 09:40

## 2023-01-01 RX ADMIN — CARVEDILOL 6.25 MG: 6.25 TABLET, FILM COATED ORAL at 10:36

## 2023-01-01 RX ADMIN — ISOSORBIDE MONONITRATE 30 MG: 30 TABLET, EXTENDED RELEASE ORAL at 11:03

## 2023-01-01 RX ADMIN — DEXAMETHASONE SODIUM PHOSPHATE 6 MG: 4 INJECTION, SOLUTION INTRAMUSCULAR; INTRAVENOUS at 09:40

## 2023-01-01 RX ADMIN — ZINC SULFATE 220 MG (50 MG) CAPSULE 220 MG: CAPSULE at 10:06

## 2023-01-01 RX ADMIN — ISOSORBIDE MONONITRATE 30 MG: 30 TABLET, EXTENDED RELEASE ORAL at 10:36

## 2023-01-01 RX ADMIN — GUAIFENESIN 600 MG: 600 TABLET, EXTENDED RELEASE ORAL at 11:03

## 2023-01-01 RX ADMIN — CEFEPIME 2 G: 2 INJECTION, POWDER, FOR SOLUTION INTRAVENOUS at 23:43

## 2023-01-01 RX ADMIN — CARVEDILOL 6.25 MG: 6.25 TABLET, FILM COATED ORAL at 21:11

## 2023-01-01 RX ADMIN — PANTOPRAZOLE SODIUM 40 MG: 40 TABLET, DELAYED RELEASE ORAL at 09:40

## 2023-01-01 RX ADMIN — LIDOCAINE 1 PATCH: 4 PATCH TOPICAL at 11:05

## 2023-01-01 RX ADMIN — FUROSEMIDE 20 MG: 10 INJECTION, SOLUTION INTRAVENOUS at 05:29

## 2023-01-01 RX ADMIN — LIDOCAINE 1 PATCH: 4 PATCH TOPICAL at 12:22

## 2023-01-01 RX ADMIN — MELATONIN 1.5 MG: 3 TAB ORAL at 01:42

## 2023-01-01 RX ADMIN — INSULIN ASPART 3 UNITS: 100 INJECTION, SOLUTION INTRAVENOUS; SUBCUTANEOUS at 08:17

## 2023-01-01 RX ADMIN — OXYCODONE HYDROCHLORIDE AND ACETAMINOPHEN 500 MG: 500 TABLET ORAL at 10:37

## 2023-01-01 RX ADMIN — IPRATROPIUM BROMIDE AND ALBUTEROL SULFATE 3 ML: 2.5; .5 SOLUTION RESPIRATORY (INHALATION) at 11:07

## 2023-01-01 RX ADMIN — ABIRATERONE ACETATE 1000 MG: 250 TABLET ORAL at 16:02

## 2023-01-01 RX ADMIN — OXYCODONE HYDROCHLORIDE AND ACETAMINOPHEN 500 MG: 500 TABLET ORAL at 21:11

## 2023-01-01 RX ADMIN — OXYCODONE HYDROCHLORIDE AND ACETAMINOPHEN 500 MG: 500 TABLET ORAL at 21:51

## 2023-01-01 RX ADMIN — GUAIFENESIN 600 MG: 600 TABLET, EXTENDED RELEASE ORAL at 22:51

## 2023-01-01 RX ADMIN — ABIRATERONE ACETATE 1000 MG: 250 TABLET ORAL at 05:26

## 2023-01-01 RX ADMIN — ZINC SULFATE 220 MG (50 MG) CAPSULE 220 MG: CAPSULE at 11:02

## 2023-01-01 RX ADMIN — AZITHROMYCIN MONOHYDRATE 500 MG: 500 INJECTION, POWDER, LYOPHILIZED, FOR SOLUTION INTRAVENOUS at 03:24

## 2023-01-01 RX ADMIN — PANTOPRAZOLE SODIUM 40 MG: 40 TABLET, DELAYED RELEASE ORAL at 12:16

## 2023-01-01 RX ADMIN — ABIRATERONE ACETATE 1000 MG: 250 TABLET ORAL at 06:15

## 2023-01-01 RX ADMIN — CEFTRIAXONE SODIUM 1 G: 1 INJECTION, POWDER, FOR SOLUTION INTRAMUSCULAR; INTRAVENOUS at 21:55

## 2023-01-01 RX ADMIN — MORPHINE SULFATE 2 MG: 2 INJECTION, SOLUTION INTRAMUSCULAR; INTRAVENOUS at 02:50

## 2023-01-01 RX ADMIN — ALLOPURINOL 100 MG: 100 TABLET ORAL at 09:40

## 2023-01-01 RX ADMIN — ALLOPURINOL 100 MG: 100 TABLET ORAL at 11:02

## 2023-01-01 RX ADMIN — GUAIFENESIN 600 MG: 600 TABLET, EXTENDED RELEASE ORAL at 12:16

## 2023-01-01 RX ADMIN — OXYCODONE HYDROCHLORIDE AND ACETAMINOPHEN 500 MG: 500 TABLET ORAL at 22:07

## 2023-01-01 RX ADMIN — CEFEPIME 2 G: 2 INJECTION, POWDER, FOR SOLUTION INTRAVENOUS at 11:13

## 2023-01-01 RX ADMIN — PANTOPRAZOLE SODIUM 40 MG: 40 TABLET, DELAYED RELEASE ORAL at 10:06

## 2023-01-01 RX ADMIN — PREDNISONE 5 MG: 5 TABLET ORAL at 22:07

## 2023-01-01 RX ADMIN — FLUTICASONE FUROATE AND VILANTEROL TRIFENATATE 1 PUFF: 100; 25 POWDER RESPIRATORY (INHALATION) at 08:08

## 2023-01-01 RX ADMIN — GUAIFENESIN 600 MG: 600 TABLET, EXTENDED RELEASE ORAL at 09:40

## 2023-01-01 RX ADMIN — LIDOCAINE 1 PATCH: 4 PATCH TOPICAL at 12:15

## 2023-01-01 RX ADMIN — DEXAMETHASONE SODIUM PHOSPHATE 6 MG: 4 INJECTION, SOLUTION INTRAMUSCULAR; INTRAVENOUS at 10:07

## 2023-01-01 RX ADMIN — CEFTRIAXONE SODIUM 1 G: 1 INJECTION, POWDER, FOR SOLUTION INTRAMUSCULAR; INTRAVENOUS at 21:33

## 2023-01-01 RX ADMIN — OXYCODONE HYDROCHLORIDE AND ACETAMINOPHEN 500 MG: 500 TABLET ORAL at 09:40

## 2023-01-01 RX ADMIN — UMECLIDINIUM 2 PUFF: 62.5 AEROSOL, POWDER ORAL at 08:09

## 2023-01-01 RX ADMIN — DONEPEZIL HYDROCHLORIDE 5 MG: 5 TABLET, FILM COATED ORAL at 21:51

## 2023-01-01 RX ADMIN — PANTOPRAZOLE SODIUM 40 MG: 40 TABLET, DELAYED RELEASE ORAL at 21:11

## 2023-01-01 RX ADMIN — ABIRATERONE ACETATE 1000 MG: 250 TABLET ORAL at 05:44

## 2023-01-01 RX ADMIN — LIDOCAINE 1 PATCH: 4 PATCH TOPICAL at 11:16

## 2023-01-01 RX ADMIN — AZITHROMYCIN MONOHYDRATE 500 MG: 500 INJECTION, POWDER, LYOPHILIZED, FOR SOLUTION INTRAVENOUS at 22:54

## 2023-01-01 RX ADMIN — AZITHROMYCIN MONOHYDRATE 500 MG: 500 INJECTION, POWDER, LYOPHILIZED, FOR SOLUTION INTRAVENOUS at 21:52

## 2023-01-01 RX ADMIN — IPRATROPIUM BROMIDE AND ALBUTEROL SULFATE 3 ML: 2.5; .5 SOLUTION RESPIRATORY (INHALATION) at 23:00

## 2023-01-01 RX ADMIN — IPRATROPIUM BROMIDE AND ALBUTEROL SULFATE 3 ML: 2.5; .5 SOLUTION RESPIRATORY (INHALATION) at 06:35

## 2023-01-01 RX ADMIN — IPRATROPIUM BROMIDE AND ALBUTEROL SULFATE 3 ML: 2.5; .5 SOLUTION RESPIRATORY (INHALATION) at 14:02

## 2023-01-01 RX ADMIN — PANTOPRAZOLE SODIUM 40 MG: 40 TABLET, DELAYED RELEASE ORAL at 21:51

## 2023-01-01 RX ADMIN — GUAIFENESIN 600 MG: 600 TABLET, EXTENDED RELEASE ORAL at 10:06

## 2023-01-01 RX ADMIN — CEFEPIME 2 G: 2 INJECTION, POWDER, FOR SOLUTION INTRAVENOUS at 00:28

## 2023-01-01 RX ADMIN — DEXAMETHASONE SODIUM PHOSPHATE 6 MG: 4 INJECTION, SOLUTION INTRAMUSCULAR; INTRAVENOUS at 10:26

## 2023-01-01 RX ADMIN — ZINC SULFATE 220 MG (50 MG) CAPSULE 220 MG: CAPSULE at 10:46

## 2023-01-01 RX ADMIN — DEXAMETHASONE SODIUM PHOSPHATE 6 MG: 4 INJECTION, SOLUTION INTRAMUSCULAR; INTRAVENOUS at 22:51

## 2023-01-01 RX ADMIN — UMECLIDINIUM 2 PUFF: 62.5 AEROSOL, POWDER ORAL at 06:37

## 2023-01-01 RX ADMIN — ALLOPURINOL 100 MG: 100 TABLET ORAL at 10:05

## 2023-01-01 RX ADMIN — GUAIFENESIN 600 MG: 600 TABLET, EXTENDED RELEASE ORAL at 22:07

## 2023-01-01 RX ADMIN — CEFEPIME 2 G: 2 INJECTION, POWDER, FOR SOLUTION INTRAVENOUS at 00:13

## 2023-01-01 RX ADMIN — DEXAMETHASONE SODIUM PHOSPHATE 6 MG: 4 INJECTION, SOLUTION INTRAMUSCULAR; INTRAVENOUS at 13:00

## 2023-01-01 RX ADMIN — FUROSEMIDE 20 MG: 10 INJECTION, SOLUTION INTRAVENOUS at 06:15

## 2023-01-01 RX ADMIN — UMECLIDINIUM 2 PUFF: 62.5 AEROSOL, POWDER ORAL at 10:41

## 2023-01-01 RX ADMIN — CEFEPIME 2 G: 2 INJECTION, POWDER, FOR SOLUTION INTRAVENOUS at 12:58

## 2023-01-01 RX ADMIN — IPRATROPIUM BROMIDE AND ALBUTEROL SULFATE 3 ML: 2.5; .5 SOLUTION RESPIRATORY (INHALATION) at 08:05

## 2023-01-01 RX ADMIN — ZINC SULFATE 220 MG (50 MG) CAPSULE 220 MG: CAPSULE at 10:36

## 2023-01-01 RX ADMIN — GUAIFENESIN 600 MG: 600 TABLET, EXTENDED RELEASE ORAL at 21:51

## 2023-01-01 RX ADMIN — CARVEDILOL 6.25 MG: 6.25 TABLET, FILM COATED ORAL at 11:03

## 2023-01-01 RX ADMIN — SERTRALINE HYDROCHLORIDE 100 MG: 50 TABLET ORAL at 10:46

## 2023-01-01 RX ADMIN — MELATONIN 1.5 MG: 3 TAB ORAL at 22:08

## 2023-01-01 RX ADMIN — PANTOPRAZOLE SODIUM 40 MG: 40 TABLET, DELAYED RELEASE ORAL at 11:03

## 2023-01-01 RX ADMIN — FLUTICASONE FUROATE AND VILANTEROL TRIFENATATE 1 PUFF: 100; 25 POWDER RESPIRATORY (INHALATION) at 08:03

## 2023-01-01 RX ADMIN — DONEPEZIL HYDROCHLORIDE 5 MG: 5 TABLET, FILM COATED ORAL at 22:51

## 2023-01-01 RX ADMIN — ALLOPURINOL 100 MG: 100 TABLET ORAL at 10:45

## 2023-01-01 RX ADMIN — OXYCODONE HYDROCHLORIDE AND ACETAMINOPHEN 500 MG: 500 TABLET ORAL at 03:17

## 2023-01-01 RX ADMIN — IPRATROPIUM BROMIDE AND ALBUTEROL SULFATE 3 ML: 2.5; .5 SOLUTION RESPIRATORY (INHALATION) at 15:35

## 2023-01-01 RX ADMIN — IPRATROPIUM BROMIDE AND ALBUTEROL SULFATE 3 ML: 2.5; .5 SOLUTION RESPIRATORY (INHALATION) at 10:31

## 2023-01-01 RX ADMIN — SERTRALINE HYDROCHLORIDE 100 MG: 50 TABLET ORAL at 10:06

## 2023-01-01 RX ADMIN — UMECLIDINIUM 2 PUFF: 62.5 AEROSOL, POWDER ORAL at 06:20

## 2023-01-01 RX ADMIN — OXYCODONE HYDROCHLORIDE AND ACETAMINOPHEN 500 MG: 500 TABLET ORAL at 22:51

## 2023-01-01 RX ADMIN — PANTOPRAZOLE SODIUM 40 MG: 40 TABLET, DELAYED RELEASE ORAL at 22:07

## 2023-01-01 RX ADMIN — INSULIN ASPART 1 UNITS: 100 INJECTION, SOLUTION INTRAVENOUS; SUBCUTANEOUS at 17:26

## 2023-01-01 RX ADMIN — GUAIFENESIN 600 MG: 600 TABLET, EXTENDED RELEASE ORAL at 10:37

## 2023-01-01 RX ADMIN — DONEPEZIL HYDROCHLORIDE 5 MG: 5 TABLET, FILM COATED ORAL at 21:11

## 2023-01-01 RX ADMIN — OXYCODONE HYDROCHLORIDE AND ACETAMINOPHEN 500 MG: 500 TABLET ORAL at 10:45

## 2023-01-01 RX ADMIN — ALLOPURINOL 100 MG: 100 TABLET ORAL at 10:37

## 2023-01-01 RX ADMIN — PANTOPRAZOLE SODIUM 40 MG: 40 TABLET, DELAYED RELEASE ORAL at 22:51

## 2023-01-01 RX ADMIN — FLUTICASONE FUROATE AND VILANTEROL TRIFENATATE 1 PUFF: 100; 25 POWDER RESPIRATORY (INHALATION) at 10:41

## 2023-01-01 RX ADMIN — CARVEDILOL 6.25 MG: 6.25 TABLET, FILM COATED ORAL at 11:22

## 2023-01-01 RX ADMIN — OXYCODONE HYDROCHLORIDE AND ACETAMINOPHEN 500 MG: 500 TABLET ORAL at 10:05

## 2023-01-01 RX ADMIN — UMECLIDINIUM 2 PUFF: 62.5 AEROSOL, POWDER ORAL at 08:02

## 2023-01-01 RX ADMIN — GUAIFENESIN 600 MG: 600 TABLET, EXTENDED RELEASE ORAL at 21:11

## 2023-01-01 RX ADMIN — DEXAMETHASONE SODIUM PHOSPHATE 6 MG: 4 INJECTION, SOLUTION INTRAMUSCULAR; INTRAVENOUS at 21:52

## 2023-01-01 RX ADMIN — CARVEDILOL 6.25 MG: 6.25 TABLET, FILM COATED ORAL at 22:07

## 2023-01-01 RX ADMIN — IPRATROPIUM BROMIDE AND ALBUTEROL SULFATE 3 ML: 2.5; .5 SOLUTION RESPIRATORY (INHALATION) at 06:19

## 2023-01-01 RX ADMIN — IPRATROPIUM BROMIDE AND ALBUTEROL SULFATE 3 ML: 2.5; .5 SOLUTION RESPIRATORY (INHALATION) at 19:32

## 2023-01-01 RX ADMIN — FUROSEMIDE 20 MG: 10 INJECTION, SOLUTION INTRAVENOUS at 06:08

## 2023-01-01 RX ADMIN — SERTRALINE HYDROCHLORIDE 100 MG: 50 TABLET ORAL at 11:03

## 2023-01-01 RX ADMIN — DONEPEZIL HYDROCHLORIDE 5 MG: 5 TABLET, FILM COATED ORAL at 22:07

## 2023-01-01 RX ADMIN — DEXAMETHASONE SODIUM PHOSPHATE 6 MG: 4 INJECTION, SOLUTION INTRAMUSCULAR; INTRAVENOUS at 21:07

## 2023-01-01 RX ADMIN — ISOSORBIDE MONONITRATE 30 MG: 30 TABLET, EXTENDED RELEASE ORAL at 10:05

## 2023-01-01 RX ADMIN — FUROSEMIDE 20 MG: 10 INJECTION, SOLUTION INTRAVENOUS at 17:26

## 2023-01-01 RX ADMIN — CARVEDILOL 6.25 MG: 6.25 TABLET, FILM COATED ORAL at 10:06

## 2023-01-01 RX ADMIN — FLUTICASONE FUROATE AND VILANTEROL TRIFENATATE 1 PUFF: 100; 25 POWDER RESPIRATORY (INHALATION) at 06:37

## 2023-01-01 RX ADMIN — CARVEDILOL 6.25 MG: 6.25 TABLET, FILM COATED ORAL at 22:51

## 2023-01-01 RX ADMIN — OXYCODONE HYDROCHLORIDE AND ACETAMINOPHEN 500 MG: 500 TABLET ORAL at 11:03

## 2023-01-01 RX ADMIN — ZINC SULFATE 220 MG (50 MG) CAPSULE 220 MG: CAPSULE at 09:40

## 2023-01-01 RX ADMIN — IPRATROPIUM BROMIDE AND ALBUTEROL SULFATE 3 ML: 2.5; .5 SOLUTION RESPIRATORY (INHALATION) at 19:20

## 2023-01-01 RX ADMIN — CARVEDILOL 6.25 MG: 6.25 TABLET, FILM COATED ORAL at 21:50

## 2023-01-01 RX ADMIN — FLUTICASONE FUROATE AND VILANTEROL TRIFENATATE 1 PUFF: 100; 25 POWDER RESPIRATORY (INHALATION) at 06:20

## 2023-01-01 RX ADMIN — SERTRALINE HYDROCHLORIDE 100 MG: 50 TABLET ORAL at 10:36

## 2023-01-01 RX ADMIN — ISOSORBIDE MONONITRATE 30 MG: 30 TABLET, EXTENDED RELEASE ORAL at 12:16

## 2023-01-01 RX ADMIN — FUROSEMIDE 20 MG: 10 INJECTION, SOLUTION INTRAVENOUS at 05:45

## 2023-01-01 RX ADMIN — AZITHROMYCIN MONOHYDRATE 500 MG: 500 INJECTION, POWDER, LYOPHILIZED, FOR SOLUTION INTRAVENOUS at 22:23

## 2023-01-01 RX ADMIN — SERTRALINE HYDROCHLORIDE 100 MG: 50 TABLET ORAL at 09:40

## 2023-01-01 RX ADMIN — PANTOPRAZOLE SODIUM 40 MG: 40 TABLET, DELAYED RELEASE ORAL at 10:36

## 2023-01-01 ASSESSMENT — ENCOUNTER SYMPTOMS
CHILLS: 0
MYALGIAS: 0
NECK PAIN: 0
CONSTITUTIONAL NEGATIVE: 1
ALLERGIC/IMMUNOLOGIC NEGATIVE: 1
NECK STIFFNESS: 0
MUSCULOSKELETAL NEGATIVE: 1
FEVER: 0
NEUROLOGICAL NEGATIVE: 1
PHOTOPHOBIA: 0
EYES NEGATIVE: 1
HEMATOLOGIC/LYMPHATIC NEGATIVE: 1
PSYCHIATRIC NEGATIVE: 1
ENDOCRINE NEGATIVE: 1
SHORTNESS OF BREATH: 1

## 2023-01-01 ASSESSMENT — ACTIVITIES OF DAILY LIVING (ADL)
ADLS_ACUITY_SCORE: 46
ADLS_ACUITY_SCORE: 39
ADLS_ACUITY_SCORE: 46
ADLS_ACUITY_SCORE: 39
ADLS_ACUITY_SCORE: 42
ADLS_ACUITY_SCORE: 47
ADLS_ACUITY_SCORE: 31
ADLS_ACUITY_SCORE: 43
ADLS_ACUITY_SCORE: 46
ADLS_ACUITY_SCORE: 35
ADLS_ACUITY_SCORE: 46
ADLS_ACUITY_SCORE: 39
ADLS_ACUITY_SCORE: 46
ADLS_ACUITY_SCORE: 35
ADLS_ACUITY_SCORE: 46
ADLS_ACUITY_SCORE: 40
ADLS_ACUITY_SCORE: 39
ADLS_ACUITY_SCORE: 46
ADLS_ACUITY_SCORE: 46
ADLS_ACUITY_SCORE: 39
ADLS_ACUITY_SCORE: 35
ADLS_ACUITY_SCORE: 46
ADLS_ACUITY_SCORE: 35
ADLS_ACUITY_SCORE: 45
ADLS_ACUITY_SCORE: 35
ADLS_ACUITY_SCORE: 42
ADLS_ACUITY_SCORE: 40
ADLS_ACUITY_SCORE: 31
ADLS_ACUITY_SCORE: 46
ADLS_ACUITY_SCORE: 35
ADLS_ACUITY_SCORE: 42
ADLS_ACUITY_SCORE: 42
ADLS_ACUITY_SCORE: 46
ADLS_ACUITY_SCORE: 46
ADLS_ACUITY_SCORE: 43
ADLS_ACUITY_SCORE: 46
ADLS_ACUITY_SCORE: 31
ADLS_ACUITY_SCORE: 38
ADLS_ACUITY_SCORE: 46
ADLS_ACUITY_SCORE: 31
ADLS_ACUITY_SCORE: 31
ADLS_ACUITY_SCORE: 43
ADLS_ACUITY_SCORE: 47

## 2023-12-07 NOTE — ED TRIAGE NOTES
Patient fell around 1200. From standing hit head on wheelchair wheel, no loc. Ecchymotic right ear. Trauma eval initiated. Patient denies pain and ambulated back to the room. Vss      Triage Assessment (Adult)       Row Name 12/07/23 1401          Triage Assessment    Airway WDL WDL        Respiratory WDL    Respiratory WDL WDL        Skin Circulation/Temperature WDL    Skin Circulation/Temperature WDL WDL        Cardiac WDL    Cardiac WDL WDL        Peripheral/Neurovascular WDL    Peripheral Neurovascular WDL WDL        Cognitive/Neuro/Behavioral WDL    Cognitive/Neuro/Behavioral WDL WDL

## 2023-12-07 NOTE — ED PROVIDER NOTES
History     Chief Complaint   Patient presents with    Fall     HPI  Malcolm Delgado is a 89 year old male who presents to the emergency department today by ambulance for further evaluation of injuries sustained in an accidental fall.    He was ambulating when he lost his balance and struck the right side of his head on his walker.  He denies any loss of consciousness.  No neck or back pain.  No visual changes or headache.  No extremity weakness or numbness.  He takes dual antiplatelet therapy with aspirin and Plavix, and is here for further evaluation.    Allergies:  Allergies   Allergen Reactions    Atorvastatin Angioedema    Diclofenac Diarrhea       Problem List:    There are no problems to display for this patient.       Past Medical History:    No past medical history on file.    Past Surgical History:    No past surgical history on file.    Family History:    No family history on file.    Social History:  Marital Status:   [2]  Social History     Tobacco Use    Smoking status: Former     Types: Cigarettes     Quit date: 1962     Years since quittin.9    Smokeless tobacco: Never   Vaping Use    Vaping Use: Never used   Substance Use Topics    Alcohol use: Yes     Alcohol/week: 4.0 standard drinks of alcohol     Types: 4 Standard drinks or equivalent per week    Drug use: Never        Medications:    abiraterone (ZYTIGA) 250 MG tablet  allopurinol (ZYLOPRIM) 100 MG tablet  aspirin 81 MG EC tablet  blood glucose (NO BRAND SPECIFIED) test strip  calcium citrate (CITRACAL) 950 (200 Ca) MG tablet  carvedilol (COREG) 6.25 MG tablet  cetirizine (ZYRTEC) 10 MG tablet  clopidogrel (PLAVIX) 75 MG tablet  donepezil (ARICEPT) 5 MG tablet  erythromycin (ROMYCIN) 5 MG/GM ophthalmic ointment  fluticasone-salmeterol (ADVAIR) 500-50 MCG/ACT inhaler  glipiZIDE (GLUCOTROL) 5 MG tablet  melatonin 5 MG tablet  metFORMIN (GLUCOPHAGE XR) 500 MG 24 hr tablet  nitroGLYcerin (NITROSTAT) 0.4 MG sublingual  tablet  sertraline (ZOLOFT) 25 MG tablet  tiotropium (SPIRIVA RESPIMAT) 2.5 MCG/ACT inhaler  Vitamin D3 (CHOLECALCIFEROL) 25 mcg (1000 units) tablet          Review of Systems  All other systems were reviewed and found to be negative.      Physical Exam   BP: 115/44  Pulse: 65  Temp: (!) 96  F (35.6  C)  Resp: 18  Weight: 101.6 kg (224 lb)  SpO2: 97 %      Physical Exam  Physical Exam:    General: Malcolm Delgado is a very pleasant 89 year old male found resting comfortably on the exam room bed, ABCs are self, pt is alert.    Skin: Is warm, pink, and dry.  He is noted to have a large hematoma to the right ear, within the helix and external auditory canal.    Head: Is otherwise atraumatic    Eyes: Anicteric    Mouth and Throat: Lips and surrounding mucosa are moist and pink    Chest: Pt has clear audible lung sounds    Musculoskeletal: Pt has good ROM in all extremities. CMS is intact with capillary refill < 2 seconds    Neurological: Pt is alert      ED Course     CT imaging of the head was obtained and was thankfully unremarkable for any acute intracranial etiology.  Please defer to the official radiological dictation regarding any imaging studies.    Given his hematoma, I did contact Dr. Rollins, general surgeon who graciously evaluated the patient in the ED and elected to decompress the hematoma.  Please refer to his note regarding this procedure.    At this time, the patient will be discharged home in stable condition.  He is to follow-up in clinic for a reeval in the next week or so and return to the emergency department with any worrisome concerns or worsening symptoms.       Results for orders placed or performed during the hospital encounter of 12/07/23 (from the past 24 hour(s))   CT Head w/o Contrast    Narrative    Exam: CT HEAD W/O CONTRAST      Exam reason: fall with R ear trauma    Technique:   Axial images of the head obtained without contrast. Coronal and  sagittal reformations were generated. This CT  was performed using one  or more of the following dose reduction techniques: automated exposure  control, adjustment of the mA and/or kV according to patient size,  and/or use of iterative reconstruction technique.    Comparison: None.        Findings:      Parenchyma: No evidence of intraparenchymal hemorrhage, mass, or  findings of acute infarct.    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 There is patchy periventricular and deep white matter  hypoattenuation, nonspecific but likely due to chronic microvascular  ischemic change.    No midline shift. The basilar cisterns are patent.    Extra-axial spaces: No extra-axial fluid collection or hemorrhage.     Ventricles: Normal.  Paranasal sinuses: Clear.   Mastoid air cells: Clear.    Osseous: No acute findings.  Orbits: Normal.    Soft tissues: Unremarkable.       Impression    Impression:  No acute intracranial abnormality.      MAURY MCRAE MD         SYSTEM ID:  [id number]       Medications - No data to display      Assessments & Plan (with Medical Decision Making)     I have reviewed the nursing notes.    I have reviewed the findings, diagnosis, plan and need for follow up with the patient.           Medical Decision Making  The patient's presentation was of moderate complexity (an acute complicated injury).    The patient's evaluation involved:  ordering and/or review of 1 test(s) in this encounter (see separate area of note for details)    The patient's management necessitated only low risk treatment.        Discharge Medication List as of 12/7/2023  4:02 PM          Final diagnoses:   Fall, initial encounter   Hematoma of right ear, initial encounter       12/7/2023   [facility] AND \[facility] [place] [facility]\""       Raphael Montana PA-C  12/07/23 9830

## 2023-12-07 NOTE — DISCHARGE INSTRUCTIONS
1. Tylenol or ibuprofen as needed for pain  2. Get plenty of rest  3. Stay hydrated  4. Follow-up in clinic as needed  5. Return to the emergency department with any worrisome concerns or worsening symptoms

## 2023-12-11 PROBLEM — U07.1 COVID-19 VIRUS INFECTION: Status: ACTIVE | Noted: 2023-01-01

## 2023-12-11 NOTE — ED TRIAGE NOTES
Pt to ER from [facility] via EMS.  Dx with COVID this AM.  Not responding appropriately, feels weak and ill. BG 91 for EMS.  Pt is on comfort cares.     Triage Assessment (Adult)       Row Name 12/11/23 3923          Triage Assessment    Airway WDL WDL        Respiratory WDL    Respiratory WDL WDL        Skin Circulation/Temperature WDL    Skin Circulation/Temperature WDL WDL        Cardiac WDL    Cardiac WDL WDL        Cognitive/Neuro/Behavioral WDL    Cognitive/Neuro/Behavioral WDL X     Level of Consciousness lethargic     Arousal Level arouses to vigorous stimulation     Mood/Behavior withdrawn;hypoactive (quiet, withdrawn)        [place] Coma Scale    Best Eye Response 3-->(E3) to speech     Best Motor Response 4-->(M4) withdraws from pain     Best Verbal Response 4-->(V4) confused     [place] Coma Scale Score 11

## 2023-12-11 NOTE — ED PROVIDER NOTES
Emergency Department Provider Note  : 1931 Age: 89 year old Sex: male MRN: [id number]    Chief Complaint   Patient presents with    Generalized Weakness    Altered Mental Status       Medical Decision Making / Assessment / Plan   89 year old male presenting with COVID-19 infection, comfort measures only status    ED Course as of 23 1419   Mon Dec 11, 2023   1410 Patient was up and ambulatory.  He stole a walker from a neighboring room and was attempting to walk out of the emergency room.  Plan to discharge back to [facility] on comfort care.   1411 Wife shows up.  She does not drive.  Took a taxi in.  Evidently patient slept for about 2 hours this morning.  He is feeling much better and would like to discharge.  He is asking for breakfast/lunch.        A shared decision making model was used. Plan and all results were discussed  Time was taken to answer all questions. Patient and/or associated parties understood and were agreeable to treatment plan.  Strict return to Emergency Department precautions as well as appropriate follow up instructions were provided. The patient was discharged in stable condition.    New Prescriptions    No medications on file       Final diagnoses:   COVID-19 virus infection   Comfort measures only status       Liam Nascimento MD  2023   Emergency Department    Clarke Fowler is a 89 year old male who presents at  1:20 PM with altered mental status and lethargy assisted living.  Tested positive for COVID and was very tired and sleepy this morning, per staff report.  At time of EMS arrival, patient was more awake and interactive.  He slept in the emergency room and after waking up, stool of the neighboring room walker and attempted to leave.  He was asking for breakfast/lunch.  He was asking to discharge.  At this point there is no reason to do further workup.    Patient is currently on comfort care measures.  Discharge back to assisted living.    I have  reviewed the Medications, Allergies, Past Medical and Surgical History, and Social History in the Epic System and with family.    Review of Systems:  Please see Subjective / HPI for pertinent positives and negatives. All other systems reviewed and found to be negative.      Objective   Patient Vitals for the past 24 hrs:   BP Temp Temp src Pulse Resp SpO2 Weight   12/11/23 1323 112/60 98.7  F (37.1  C) Tympanic 65 20 94 % 101.6 kg (224 lb)     Physical Exam:     General: Awake, alert, in no acute respiratory distress.  Pale and chronically ill-appearing  Head: Normocephalic, atraumatic.  Chest/Respiratory: Mild tachypnea, no cough, nonlabored breathing.  Cardiovascular: regular rate and rhythm.  Abdominal: Soft, non-distended, non-tender.  Extremities: No obvious long bone deformity, ambulatory with walker.  Neurological: extremities without gross deficit.  Skin: Warm, no rashes, lesions.  Bruising on extremities, pale.   Psychiatric: Appropriate affect.     Procedures / Critical Care   Procedures    Aggregate Critical Care Time: None.     No orders of the defined types were placed in this encounter.      RESULTS: As noted above.          Medical/Surgical History:  No past medical history on file.  No past surgical history on file.    Medications:  No current facility-administered medications for this encounter.     Current Outpatient Medications   Medication    abiraterone (ZYTIGA) 250 MG tablet    allopurinol (ZYLOPRIM) 100 MG tablet    aspirin 81 MG EC tablet    blood glucose (NO BRAND SPECIFIED) test strip    calcium citrate (CITRACAL) 950 (200 Ca) MG tablet    carvedilol (COREG) 6.25 MG tablet    cetirizine (ZYRTEC) 10 MG tablet    clopidogrel (PLAVIX) 75 MG tablet    donepezil (ARICEPT) 5 MG tablet    erythromycin (ROMYCIN) 5 MG/GM ophthalmic ointment    fluticasone-salmeterol (ADVAIR) 500-50 MCG/ACT inhaler    glipiZIDE (GLUCOTROL) 5 MG tablet    melatonin 5 MG tablet    metFORMIN (GLUCOPHAGE XR) 500 MG 24 hr  tablet    nitroGLYcerin (NITROSTAT) 0.4 MG sublingual tablet    sertraline (ZOLOFT) 25 MG tablet    tiotropium (SPIRIVA RESPIMAT) 2.5 MCG/ACT inhaler    Vitamin D3 (CHOLECALCIFEROL) 25 mcg (1000 units) tablet       Allergies:  Atorvastatin and Diclofenac    Relevant labs, images, EKGs, Epic and outside hospital (if applicable) charts were reviewed. The findings, diagnosis, plan, and need for follow up were discussed with the patient/family. Nursing notes were reviewed.      Liam Nascimento MD  12/11/23 9524

## 2023-12-11 NOTE — ED NOTES
Provider spoke with patients wife and notified of discharge.  Per provider, he called [facility] to notify staff of patient returning.

## 2023-12-11 NOTE — DISCHARGE INSTRUCTIONS
Okay to use Tylenol or ibuprofen if needed for pain or fever.     See home care instructions regarding COVID-19 infection.    Called the assisted living nurse practitioner if needed for additional concerns.

## 2023-12-25 PROBLEM — J18.9 PNEUMONIA OF RIGHT LOWER LOBE DUE TO INFECTIOUS ORGANISM: Status: ACTIVE | Noted: 2023-01-01

## 2023-12-26 NOTE — PROGRESS NOTES
[facility]    Medicine Progress Note - Hospitalist Service    Date of Admission:  12/25/2023    Assessment & Plan      Community-acquired pneumonia:    -Continue with ceftriaxone and azithromycin.    -He is not requiring any oxygen at this time.     -Likely superimposed pneumonia due to recent COVID infection  -Sputum culture, urinary strep/Legionella  -Mucinex  -Encourage incentive spirometer and Acapella use.    Anemia, suspect due to chronic GI blood loss:  He does not appear to have acute GI bleed but I suspect he has had some chronic GI blood loss as he has brown guaiac-positive stool. Also has iron deficiency.  -Hold off on IV iron given infection above.  Consider outpatient iron supplement at discharge.  -H&H q6H. If hgb stabilizes next check can likely go to q8h monitor.  -Hold Eliquis.    -Will consult surgery.  If still actively bleeding or rapid drop in hemoglobin, will likely need EGD and colonoscopy, otherwise can probably be done outpatient depending on clinical course. -Clear liquid diet for now  -PPI bid     CAD, PAF, hypertension: Recent cardiology visit in early December has switched from Plavix and aspirin to Eliquis.  -Continue Coreg, and Imdur with hold parameters  -Hold Eliquis as above  -Hold Lasix with risk of bleeding.    Type 2 diabetes:    -Hold metformin and glipizide  -Continue on sliding scale     Dementia  -Continue on Aricept and Zoloft     COPD/emphysema:  Does not appear to have any acute exacerbation.    -Continue his home inhalers with appropriate hospital substitution.  -Duo neb as needed    Hx prostate cancer  -Continue home Zytiga and prednisone          Diet: Clear Liquid Diet    DVT Prophylaxis: Pneumatic Compression Devices  Collins Catheter: Not present  Lines: None     Cardiac Monitoring: ACTIVE order. Indication: Tachyarrhythmias, acute (48 hours)  Code Status: No CPR- Do NOT Intubate      Clinically Significant Risk Factors Present on Admission               # Hypoalbuminemia: Lowest albumin = 3 g/dL at 12/26/2023  6:17 AM, will monitor as appropriate  # Drug Induced Coagulation Defect: home medication list includes an anticoagulant medication                    Disposition Plan      Expected Discharge Date: 12/27/2023                    Zhou Sutton MD  Hospitalist Service  [facility]  Securely message with Spotlight (more info)  Text page via [facility] Paging/Directory   ______________________________________________________________________    Interval History   Patient states he still cough but is not producing any sputum. He otherwise has no fever or chills.  He currently has no chest pain or shortness of breath.  Patient denies having any nausea or vomiting.  He has not noticed his blood was dark but states that he does not really pay attention to it.  Otherwise no dysuria.  Patient states he does not take over-the-counter NSAID.    Physical Exam   Vital Signs: Temp: 97.3  F (36.3  C) Temp src: Tympanic BP: (!) 145/48 Pulse: 71   Resp: 16 SpO2: 91 % O2 Device: None (Room air)    Weight: 188 lbs 14.4 oz    General Appearance: In nad  Respiratory: Crackly at the bases bilaterally with no wheezing.  Some rhonchi until take a deep breath and had acute few cough which helped improve the rhonchi.  Cardiovascular: RRR, no murmur.  GI: soft, NT/ND, BS+  Skin: No rash.  Neuro: A&O.  Moving his extremity in bed dependently.  Strength symmetric in arms and legs.  Psych: Calm and pleasant    Medical Decision Making       40 MINUTES SPENT BY ME on the date of service doing chart review, history, exam, documentation & further activities per the note.      Data     I have personally reviewed the following data over the past 24 hrs:    15.7 (H)  \   7.9 (L)   / 423     139 102 33.1 (H) /  80   4.5 28 1.40 (H) \     ALT: 17 AST: 32 AP: 83 TBILI: 0.5   ALB: 3.0 (L) TOT PROTEIN: 5.7 (L) LIPASE: N/A     Trop: 67 (H) BNP: N/A     TSH: N/A T4: N/A A1C: 5.9      Procal: 0.37 CRP: N/A Lactic Acid: 1.1       INR:  1.69 (H) PTT:  N/A   D-dimer:  N/A Fibrinogen:  N/A     Ferritin:  53 % Retic:  N/A LDH:  N/A       Imaging results reviewed over the past 24 hrs:   Recent Results (from the past 24 hour(s))   XR Chest Port 1 View    Narrative    XR CHEST PORT 1 VIEW    HISTORY: 89 yearsMale Patient with shortness of breath.  Recent  diagnosis of COVID.  Rule out pneumonia    TECHNIQUE: A single view of the chest was performed    COMPARISON: None    FINDINGS: There is airspace opacity at the right lung base. The upper  right lung and left lung are clear        Impression    IMPRESSION: Consolidating airspace opacity at the right lung base  suggestive of pneumonia.    NAPOLEON ARCHULETA MD         SYSTEM ID:  [id number]

## 2023-12-26 NOTE — PLAN OF CARE
Patient tolerated transfusion of 2 units of blood. VSS. Patient is intermittently disoriented x3. Trace edema noted to lower extremities. Voiding without difficulty. Assist of 1 to ambulate. Dyspnea on exertion noted. O2 stable on room air. Patient denies cough. LS clear/diminished.

## 2023-12-26 NOTE — ED PROVIDER NOTES
History     Chief Complaint   Patient presents with    Shortness of Breath     HPI  Malcolm Delgado is a 89 year old male who today with complaints of shortness of breath.  Symptoms began approximately less than an hour ago when patient was noted to be short of breath.  Given a neb treatment at the group home and decided to be brought here to the emergency department.  Patient feeling well at this current time.  Denies any chest pain    Allergies:  Allergies   Allergen Reactions    Atorvastatin Angioedema    Diclofenac Diarrhea       Problem List:    Patient Active Problem List    Diagnosis Date Noted    COVID-19 virus infection 2023     Priority: Medium    Comfort measures only status 2023     Priority: Medium        Past Medical History:    No past medical history on file.    Past Surgical History:    No past surgical history on file.    Family History:    No family history on file.    Social History:  Marital Status:   [2]  Social History     Tobacco Use    Smoking status: Former     Types: Cigarettes     Quit date: 1962     Years since quittin.0    Smokeless tobacco: Never   Vaping Use    Vaping Use: Never used   Substance Use Topics    Alcohol use: Yes     Alcohol/week: 4.0 standard drinks of alcohol     Types: 4 Standard drinks or equivalent per week    Drug use: Never        Medications:    abiraterone (ZYTIGA) 250 MG tablet  allopurinol (ZYLOPRIM) 100 MG tablet  aspirin 81 MG EC tablet  blood glucose (NO BRAND SPECIFIED) test strip  calcium citrate (CITRACAL) 950 (200 Ca) MG tablet  carvedilol (COREG) 6.25 MG tablet  cetirizine (ZYRTEC) 10 MG tablet  clopidogrel (PLAVIX) 75 MG tablet  donepezil (ARICEPT) 5 MG tablet  erythromycin (ROMYCIN) 5 MG/GM ophthalmic ointment  fluticasone-salmeterol (ADVAIR) 500-50 MCG/ACT inhaler  glipiZIDE (GLUCOTROL) 5 MG tablet  melatonin 5 MG tablet  metFORMIN (GLUCOPHAGE XR) 500 MG 24 hr tablet  nitroGLYcerin (NITROSTAT) 0.4 MG sublingual  tablet  sertraline (ZOLOFT) 25 MG tablet  tiotropium (SPIRIVA RESPIMAT) 2.5 MCG/ACT inhaler  Vitamin D3 (CHOLECALCIFEROL) 25 mcg (1000 units) tablet          Review of Systems   Constitutional: Negative.  Negative for chills and fever.   HENT: Negative.     Eyes: Negative.  Negative for photophobia.   Respiratory:  Positive for shortness of breath.    Cardiovascular:  Negative for chest pain.   Endocrine: Negative.    Genitourinary: Negative.    Musculoskeletal: Negative.  Negative for myalgias, neck pain and neck stiffness.   Skin: Negative.    Allergic/Immunologic: Negative.    Neurological: Negative.    Hematological: Negative.    Psychiatric/Behavioral: Negative.         Physical Exam   BP: (!) 150/127  Pulse: 72  Temp: 99.5  F (37.5  C)  Resp: 18  SpO2: 91 %      Physical Exam  Constitutional:       General: He is not in acute distress.     Appearance: He is well-developed and normal weight. He is not toxic-appearing.   HENT:      Head: Normocephalic.   Eyes:      Extraocular Movements: Extraocular movements intact.      Pupils: Pupils are equal, round, and reactive to light.   Cardiovascular:      Rate and Rhythm: Normal rate and regular rhythm.   Pulmonary:      Effort: Pulmonary effort is normal.      Breath sounds: Normal breath sounds. No decreased breath sounds, wheezing, rhonchi or rales.   Musculoskeletal:         General: Normal range of motion.      Cervical back: Normal range of motion and neck supple.   Skin:     General: Skin is warm.      Capillary Refill: Capillary refill takes less than 2 seconds.   Neurological:      General: No focal deficit present.      Mental Status: He is alert.   Psychiatric:         Mood and Affect: Mood normal.         Behavior: Behavior normal.         ED Course              ED Course as of 12/25/23 2123   Mon Dec 25, 2023   1954 Notified of hemoglobin of 5.6.  Rectal exam performed.   2119 Paged and case discussed with Dr. Jewell.  Plan admit     Procedures         EKG  -A-fib with PVCs         Results for orders placed or performed during the hospital encounter of 12/25/23 (from the past 24 hour(s))   CBC with platelets differential    Narrative    The following orders were created for panel order CBC with platelets differential.  Procedure                               Abnormality         Status                     ---------                               -----------         ------                     CBC with platelets and d...[[id number]]  Abnormal            Final result                 Please view results for these tests on the individual orders.   Basic metabolic panel   Result Value Ref Range    Sodium 138 135 - 145 mmol/L    Potassium 5.0 3.4 - 5.3 mmol/L    Chloride 99 98 - 107 mmol/L    Carbon Dioxide (CO2) 28 22 - 29 mmol/L    Anion Gap 11 7 - 15 mmol/L    Urea Nitrogen 36.7 (H) 8.0 - 23.0 mg/dL    Creatinine 1.59 (H) 0.67 - 1.17 mg/dL    GFR Estimate 41 (L) >60 mL/min/1.73m2    Calcium 8.6 8.2 - 9.6 mg/dL    Glucose 165 (H) 70 - 99 mg/dL   CBC with platelets and differential   Result Value Ref Range    WBC Count 13.2 (H) 4.0 - 11.0 10e3/uL    RBC Count 2.37 (L) 4.40 - 5.90 10e6/uL    Hemoglobin 5.6 (LL) 13.3 - 17.7 g/dL    Hematocrit 19.1 (L) 40.0 - 53.0 %    MCV 81 78 - 100 fL    MCH 23.6 (L) 26.5 - 33.0 pg    MCHC 29.3 (L) 31.5 - 36.5 g/dL    RDW 18.4 (H) 10.0 - 15.0 %    Platelet Count 419 150 - 450 10e3/uL    % Neutrophils 80 %    % Lymphocytes 11 %    % Monocytes 8 %    % Eosinophils 0 %    % Basophils 0 %    % Immature Granulocytes 1 %    NRBCs per 100 WBC 0 <1 /100    Absolute Neutrophils 10.7 (H) 1.6 - 8.3 10e3/uL    Absolute Lymphocytes 1.4 0.8 - 5.3 10e3/uL    Absolute Monocytes 1.0 0.0 - 1.3 10e3/uL    Absolute Eosinophils 0.0 0.0 - 0.7 10e3/uL    Absolute Basophils 0.0 0.0 - 0.2 10e3/uL    Absolute Immature Granulocytes 0.1 <=0.4 10e3/uL    Absolute NRBCs 0.1 10e3/uL   Troponin T, High Sensitivity   Result Value Ref Range    Troponin T, High Sensitivity 70  (H) <=22 ng/L   XR Chest Port 1 View    Narrative    XR CHEST PORT 1 VIEW    HISTORY: 89 yearsMale Patient with shortness of breath.  Recent  diagnosis of COVID.  Rule out pneumonia    TECHNIQUE: A single view of the chest was performed    COMPARISON: None    FINDINGS: There is airspace opacity at the right lung base. The upper  right lung and left lung are clear        Impression    IMPRESSION: Consolidating airspace opacity at the right lung base  suggestive of pneumonia.    NAPOLEON ARCHULETA MD         SYSTEM ID:  [id number]   Occult blood stool   Result Value Ref Range    Occult Blood Positive (A) Negative   ABO/Rh type and screen    Narrative    The following orders were created for panel order ABO/Rh type and screen.  Procedure                               Abnormality         Status                     ---------                               -----------         ------                     Adult Type and Screen[[id number]]                            Preliminary result           Please view results for these tests on the individual orders.   Adult Type and Screen   Result Value Ref Range    ABO/RH(D) A POS     SPECIMEN EXPIRATION DATE [id number]        Medications - No data to display    Assessments & Plan (with Medical Decision Making)     89-year-old male who presents today from nursing home with complaints of shortness of breath.  Recent directly diagnosis having COVID.  Was given a neb treatment at the nursing home and brought into the emergency department.  Feeling better on arrival.  Had no complaints and no chest pain.      Unexpected findings of a hemoglobin of 5.6.  He was heme positive from below but no history of hematemesis or change in stool color.  Is currently being typed and screened and was to be transfused 2 units of blood and sent home. However troponin also noted to be high at 70 and chest x-ray shows a pneumonia.     Given recent COVID diagnosis, age and additional lab findings, I feel  patient best served by being admitted for work up and blood transfusion.  Of note patient is a DNR.        New Prescriptions    No medications on file       Final diagnoses:   Anemia, unspecified type   NSTEMI (non-ST elevated myocardial infarction) (H)   Chronic atrial fibrillation (H)   Pneumonia of right lower lobe due to infectious organism       12/25/2023   [facility]       Cristhian Alvarado MD  01/01/24 0593

## 2023-12-26 NOTE — PROGRESS NOTES
Patient was saturating in the high 80's on room air. Placed on a nasal cannula at 2 LPM, saturations increased to 94%.    MDI's and flutter valve done as ordered.    Rayray Mercado, RT

## 2023-12-26 NOTE — PHARMACY-ADMISSION MEDICATION HISTORY
Pharmacist Admission Medication History    Admission medication history is complete. The information provided in this note is only as accurate as the sources available at the time of the update.    Information Source(s): Facility (U/NH/) medication list/MAR via  [facility]    Pertinent Information: [facility] [place] [place] but uses [facility] for acute needs    Changes made to PTA medication list:  Added: Eliquis, Imdur, lidocaine patch, Ocuvite, prednisone, Duonebs PRN  Deleted: aspirin 81 mg, clopidogrel, calcium citrate, erythromycin eye ointment  Changed: allopurinol to once daily, cetirizine to scheduled, metformin to 1000 mg, sertraline to 100 mg    Medication Affordability:  Not including over the counter (OTC) medications, was there a time in the past 3 months when you did not take your medications as prescribed because of cost?: Unable to Assess    Allergies reviewed with patient and updates made in EHR: yes    Medication History Completed By: Reena Ramos [facility] 12/26/2023 1:49 PM    PTA Med List   Medication Sig Last Dose    abiraterone (ZYTIGA) 250 MG tablet Take 1,000 mg by mouth daily 12/25/2023 at 0500    apixaban ANTICOAGULANT (ELIQUIS) 5 MG tablet Take 5 mg by mouth 2 times daily 12/25/2023 at AM    blood glucose (NO BRAND SPECIFIED) test strip 1 strip by In Vitro route 3 times daily Unknown at N/A    carvedilol (COREG) 6.25 MG tablet Take 6.25 mg by mouth 2 times daily (with meals) 12/25/2023 at AM    furosemide (LASIX) 20 MG tablet Take 20 mg by mouth daily 12/25/2023 at AM    glipiZIDE (GLUCOTROL) 5 MG tablet Take 2.5 mg by mouth 2 times daily (before meals) 12/25/2023 at 1700    ipratropium - albuterol 0.5 mg/2.5 mg/3 mL (DUONEB) 0.5-2.5 (3) MG/3ML neb solution Take 1 vial by nebulization every 6 hours as needed for shortness of breath, wheezing or cough Unknown    isosorbide mononitrate (IMDUR) 30 MG 24 hr tablet Take 30 mg by mouth daily 12/25/2023 at AM     lidocaine (LIDODERM) 5 % patch Place onto the skin every 24 hours To prevent lidocaine toxicity, patient should be patch free for 12 hrs daily. 12/25/2023 at AM    metFORMIN (GLUCOPHAGE XR) 500 MG 24 hr tablet Take 500 mg by mouth daily (with dinner) 12/25/2023 at AM    multivitamin  with lutein (OCUVITE WITH LUTEIN) CAPS per capsule Take 1 tablet by mouth daily 12/25/2023 at AM    nitroGLYcerin (NITROSTAT) 0.4 MG sublingual tablet Place 0.4 mg under the tongue every 5 minutes as needed Unknown    predniSONE (DELTASONE) 5 MG tablet Take 5 mg by mouth 2 times daily 12/25/2023 at 1500    sertraline (ZOLOFT) 100 MG tablet Take 100 mg by mouth daily 12/25/2023 at AM    tiotropium (SPIRIVA RESPIMAT) 2.5 MCG/ACT inhaler Inhale 2 puffs into the lungs daily 12/25/2023 at AM

## 2023-12-26 NOTE — ED TRIAGE NOTES
PT presents to ED with complaints of sob.Staff reports pt appearing cyanotic and in respiratory distress.  Neb given before EMS arrived, symptoms decreased.  HX covid in past month.  Mary Winters RN.............................12/25/2023 7:01 PM

## 2023-12-26 NOTE — PROGRESS NOTES
Admission Note    Data:  Malcolm Delgado admitted to 355 from emergency room at 2220.      Action:  Dr. Jewell has been notified of admission. Pt oriented to unit, call light in reach.     Response:  Patient tolerated transfer. Temp: (!) 96.6  F (35.9  C) Temp src: Tympanic BP: (!) 140/56 Pulse: 77   Resp: 16 SpO2: 90 % O2 Device: None (Room air)

## 2023-12-26 NOTE — PROGRESS NOTES
Interdisciplinary Discharge Planning Note    Anticipated Discharge Date: TBD    Anticipated Discharge Location: [facility]     Clinical Needs Before Discharge:   Medical Stability     Treatment Needs After Discharge:   None identified at this time    Potential Barriers to Discharge: None identified at this time    CHI Neito  12/26/2023,  1:25 PM

## 2023-12-26 NOTE — PLAN OF CARE
Goal Outcome Evaluation:       Patients last hemoglobin came back at 8.3. Patient is still on clear liquid diet and is tolerating this. Patients vss. O2 in the 90's on room air. BP (!) 145/48 (BP Location: Right arm, Patient Position: Semi-Hamilton's, Cuff Size: Adult Regular)   Pulse 71   Temp 97.3  F (36.3  C) (Tympanic)   Resp 16   Wt 85.7 kg (188 lb 14.4 oz)   SpO2 91%    Patient has some shortness of breath and rest and with exertion. Wheezes in all lung fields this morning. Patient is an assist of 1 with a walker to ambulate. Patient has some discomfort in his mid back, lidocaine patch applied. No other complaints of pain at this time.

## 2023-12-26 NOTE — H&P
HOSPITALIST TELEMED ADMISSION H&P    Service Date : 12/26/2023  Dr. Best BELCHER, am located in [place].   Malcolm Delgado is located in [place] at [facility].   The RN or tech on duty in the ED is assisting me today with this patient.    chief complaint: tired    History of Present Illness:  Patient is a 89-year-old male, has history of AFib, coronary disease, diabetes, asthma, sent from nursing home due to shortness of breath.   He recently had COVID.He says he is felt sick for about a week with some cough and shortness of breath.  No chest pain.  No known fevers.  No nausea vomiting diarrhea abdominal pain.    In the ER, he was afebrile stable vital signs, satting 94% on room air.  BMP showed a creatinine of 1.6 we do not have any old creatinines on him to compare.  CBC showed a white count 13.  Hemoglobin was quite low at 5.6 and we do not have any old hemoglobin on him.  MCV was 81.  Troponin was 70.  Occult blood was positive the stool was reportedly brown.  Chest x-ray showed right lung base pneumonia in the ER, he was given ceftriaxone, was ordered for 2 units of PRBCs, and was referred for admission.    He denies any blood in stool or black stools. He is on aspirin and Plavix but he has not on Coumadin or Eliquis etc.    Past Medical History  No past medical history on file.   Patient Active Problem List   Diagnosis    COVID-19 virus infection    Comfort measures only status    Chronic atrial fibrillation (H)    NSTEMI (non-ST elevated myocardial infarction) (H)    Pneumonia of right lower lobe due to infectious organism    Anemia, unspecified type   CAD s/p stent  DM2  Asthma  Prostate cancer? (Based on med list)    Past Surgical History  No past surgical history on file.   Social History  Malcolm  reports that he quit smoking about 62 years ago. His smoking use included cigarettes. He has never used smokeless tobacco. He reports current alcohol use of about 4.0 standard drinks of alcohol per week. He reports  that he does not use drugs.    Family History  Malcolm's family history is not on file.    Home Medications  Current Outpatient Medications   Medication Instructions    abiraterone (ZYTIGA) 1,000 mg, Oral, DAILY    allopurinol (ZYLOPRIM) 100 mg, Oral, 2 TIMES DAILY    aspirin 81 mg, Oral, DAILY    blood glucose (NO BRAND SPECIFIED) test strip 1 strip, In Vitro, 3 TIMES DAILY    calcium citrate (CITRACAL) 950 mg, Oral, DAILY    carvedilol (COREG) 6.25 mg, Oral, 2 TIMES DAILY WITH MEALS    cetirizine (ZYRTEC) 10 mg, Oral, DAILY PRN    clopidogrel (PLAVIX) 75 mg, Oral, DAILY    donepezil (ARICEPT) 5 MG tablet 1 tablet, Oral, AT BEDTIME    erythromycin (ROMYCIN) 5 MG/GM ophthalmic ointment Right Eye, DAILY PRN    fluticasone-salmeterol (ADVAIR) 500-50 MCG/ACT inhaler 1 puff, Inhalation, 2 TIMES DAILY, Rinse after use     glipiZIDE (GLUCOTROL) 2.5 mg, Oral, 2 TIMES DAILY BEFORE MEALS    melatonin 5 mg, Oral, AT BEDTIME    metFORMIN (GLUCOPHAGE XR) 500 mg, Oral, DAILY WITH SUPPER    nitroGLYcerin (NITROSTAT) 0.4 mg, Sublingual, EVERY 5 MIN PRN    sertraline (ZOLOFT) 25 mg, Oral, DAILY    tiotropium (SPIRIVA RESPIMAT) 2.5 MCG/ACT inhaler 2 puffs, Inhalation, DAILY    Vitamin D3 (CHOLECALCIFEROL) 50 mcg, Oral, DAILY       Allergies  Allergies   Allergen Reactions    Atorvastatin Angioedema    Diclofenac Diarrhea        10 pt ROS neg except as noted in HPI    Physical Exam:  I performed all aspects of the physical examination via Telemedicine associated with two way audio and video communication.    Vital Signs: Blood pressure (!) 143/59, pulse 75, temperature (!) 96.7  F (35.9  C), resp. rate 16, weight 84.9 kg (187 lb 3.2 oz), SpO2 94%.    Physical Exam    Gen:  Elderly male, pleasant, nontoxic-appearing, in no acute distress, lying semi-supine in hospital stretcher  HEENT:  Anicteric sclera, PER, hearing intact to voice  Resp:  No accessory muscle use,  coarse rhonchi bilaterally  Card:  No murmur, normal S1, S2   Abd:   Soft per RN exam, no TTP, non-distended, normoactive bowel sounds are present  Musc:   no edema  Psych:   not anxious, not agitated    DATA  Labs:  I have personally reviewed the following studies:  Recent Labs   Lab 12/25/23 1921   POTASSIUM 5.0   CHLORIDE 99   CO2 28   BUN 36.7*      Recent Labs   Lab 12/25/23 1921   WBC 13.2*   HGB 5.6*   HCT 19.1*          Imaging: ER imaging reviewed    ASSESSMENT/PLAN:     1. Community-acquired pneumonia:  Admit to inpatient status.  Continue with ceftriaxone and azithromycin.  He is not requiring any oxygen at this time.   Likely superimposed pneumonia due to recent COVID infection    3. Anemia, suspect due to chronic blood loss:  He does not appear to have acute GI bleed but I suspect he has had some chronic GI blood loss as he has brown guaiac-positive stool.  Will check iron stores.  Check H&H after transfusion.  Hold aspirin and Plavix.  Will need EGD and colonoscopy at some point, either inpatient or outpatient depending on clinical course.  Clear liquid diet for now    4. CAD, hypertension:  Continue Coreg, hold Plavix and aspirin. Trend trop, may have some demand ischemia from anemia, but not c/w ACS    5. Type 2 diabetes:  Hold metformin and glipizide, order sliding scale    6. Dementia?  Not listed on med list but he is on Aricept, will continue that as well as his Zoloft    7. Asthma:  I wonder if he has COPD based on his meds.  Does not appear to have any acute exacerbation.  Continue his regular inhaler regimen      DVT prophylaxis:  SCDs   Code status:  DNR DNI per nursing facility records     Clinically Significant Risk Factors Present on Admission                # Drug Induced Platelet Defect: home medication list includes an antiplatelet medication                         The plan was discussed with - Patient  Time: 30 min

## 2023-12-27 NOTE — PLAN OF CARE
"At approx 2300 another nurse was in room with pt getting up from toilet, she described it as pt was struggling to breath.  He was mouth breathing, difficult to get pt back to bed, he was able to sit down on his rolling walking.  She got pt back to bed and yelled for help from other staff, RT was called and came to bedside.  Pt placed on oxymask 10 LPM for a min.  When this writer came to room pt was titrated down to 4 LPM via oxymask with SpO2 88% and increased to 96%.  RT administered a duo neb and pt titrated to 2 LPM via oxymask, with SpO2 at 94%-96%.    Pt reports that he is feeling much better.  RR 18.  /75 (BP Location: Right arm, Patient Position: Semi-Hamilton's, Cuff Size: Adult Regular)   Pulse 63   Temp 97  F (36.1  C) (Tympanic)   Resp 18   Wt 85.7 kg (188 lb 14.4 oz)   SpO2 96%   Lung sounds are dim on right side, Rhonchi bilaterally.      Goal Outcome Evaluation:      Plan of Care Reviewed With: patient    Overall Patient Progress: no changeOverall Patient Progress: no change      Problem: Adult Inpatient Plan of Care  Goal: Plan of Care Review  Description: The Plan of Care Review/Shift note should be completed every shift.  The Outcome Evaluation is a brief statement about your assessment that the patient is improving, declining, or no change.  This information will be displayed automatically on your shift  note.  Outcome: Progressing  Flowsheets (Taken 12/26/2023 2330)  Plan of Care Reviewed With: patient  Overall Patient Progress: no change  Goal: Patient-Specific Goal (Individualized)  Description: You can add care plan individualizations to a care plan. Examples of Individualization might be:  \"Parent requests to be called daily at 9am for status\", \"I have a hard time hearing out of my right ear\", or \"Do not touch me to wake me up as it startles  me\".  Outcome: Progressing  Flowsheets (Taken 12/26/2023 2330)  Individualized Care Needs: SpO2 monitoring, oxygenation  Anxieties, Fears or " Concerns: none at this time  Goal: Absence of Hospital-Acquired Illness or Injury  Outcome: Progressing  Intervention: Identify and Manage Fall Risk  Recent Flowsheet Documentation  Taken 12/26/2023 2211 by Christa García RN  Safety Promotion/Fall Prevention:   activity supervised   assistive device/personal items within reach   clutter free environment maintained   increased rounding and observation   mobility aid in reach   nonskid shoes/slippers when out of bed   room organization consistent   safety round/check completed   treat reversible contributory factors   treat underlying cause  Goal: Optimal Comfort and Wellbeing  Outcome: Progressing  Goal: Readiness for Transition of Care  Outcome: Progressing     Problem: Anemia  Goal: Anemia Symptom Improvement  Outcome: Progressing  Intervention: Monitor and Manage Anemia  Recent Flowsheet Documentation  Taken 12/26/2023 2211 by Christa García RN  Safety Promotion/Fall Prevention:   activity supervised   assistive device/personal items within reach   clutter free environment maintained   increased rounding and observation   mobility aid in reach   nonskid shoes/slippers when out of bed   room organization consistent   safety round/check completed   treat reversible contributory factors   treat underlying cause     Problem: Fall Injury Risk  Goal: Absence of Fall and Fall-Related Injury  Outcome: Progressing  Intervention: Promote Injury-Free Environment  Recent Flowsheet Documentation  Taken 12/26/2023 2211 by Christa García RN  Safety Promotion/Fall Prevention:   activity supervised   assistive device/personal items within reach   clutter free environment maintained   increased rounding and observation   mobility aid in reach   nonskid shoes/slippers when out of bed   room organization consistent   safety round/check completed   treat reversible contributory factors   treat underlying cause

## 2023-12-27 NOTE — PLAN OF CARE
Goal Outcome Evaluation:  Took external catheter off of patient as patient was not understanding that he could urinate with it on. Got patient up to bedside commode with assist of 2 and transfer belt, patient did well with this and voided 600ml's of clear yellow urine. Standing scale weight obtained. Turned patient down to 1L O2 as patient was at 96% but patient desatted to 87% with activity. RT at bedside to do scheduled inhalers, turned back up to 2L. Patients lungs are coarse, wheezy and diminished with notable shortness of breath.

## 2023-12-27 NOTE — PROGRESS NOTES
Patient is receiving blood. Vitals taken before procedure and 15 minutes after beginning No adverse effects noted. Patient seems to be more alert and awake than he was earlier this morning. Work of breathing has decreased, patient is at 4L via nasal cannula. Currently visiting with his daughter in his room.

## 2023-12-27 NOTE — PLAN OF CARE
Provider updated on pt.  Labored breathing, using abd muscles.  RR 20-24 which has decreased from 32.  Pt is not able to tell me his name or  and just moans when I ask him if he is feeling any better.  Where before pt was able to talk to me.  RT updated as well.  Provider ordered chest x-ray.  LS coarse, rhonchi bilaterally

## 2023-12-27 NOTE — PLAN OF CARE
Blood pressure 132/61, pulse 63, temperature 97  F (36.1  C), temperature source Tympanic, resp. rate 28, weight 85.5 kg (188 lb 8 oz), SpO2 96%. s pt states he Is feeling more short of breath.  He appears to be more short of breath with abd muscle use.  RR 28-30/min.  RT updated.  Messaged on call provider.      Pending return message from on call provider.     Goal Outcome Evaluation:      Plan of Care Reviewed With: patient    Overall Patient Progress: no changeOverall Patient Progress: no change

## 2023-12-27 NOTE — PROGRESS NOTES
Interdisciplinary Discharge Planning Note    Anticipated Discharge Date: TBD    Anticipated Discharge Location: [facility]     Clinical Needs Before Discharge:   Medical Stability     Treatment Needs After Discharge:  None identified    Potential Barriers to Discharge: None identified     CHI Nieto  12/27/2023,  3:25 PM

## 2023-12-27 NOTE — PHARMACY-CONSULT NOTE
Pharmacy- Renal Dose Adjustment    Patient Active Problem List   Diagnosis    COVID-19 virus infection    Comfort measures only status    Chronic atrial fibrillation (H)    NSTEMI (non-ST elevated myocardial infarction) (H)    Pneumonia of right lower lobe due to infectious organism    Anemia, unspecified type        Relevant Labs:  Recent Labs   Lab Test 12/27/23  0520 12/26/23  1843 12/26/23  1051 12/26/23  0617   WBC 14.4*  --   --  15.7*   HGB 7.7*  7.7* 8.0*   < > 7.9*     --   --  423    < > = values in this interval not displayed.        CrCl: 46 mL/min      Intake/Output Summary (Last 24 hours) at 12/27/2023 0959  Last data filed at 12/27/2023 0759  Gross per 24 hour   Intake 1035 ml   Output 600 ml   Net 435 ml          Per Renal Dose Adjustment Protocol, will adjust:  Cefepime to 2G Q12h for ordered indication of PNA and CrCL 30-60 mL/min      Will continue to follow and make adjustments accordingly. Thank You.    Renée Grande [facility] ....................  12/27/2023   9:59 AM

## 2023-12-27 NOTE — PROGRESS NOTES
Patient ambulated to the recliner with an assist of 1 with gait belt and a walker. Patient did not have any shortness of breath with this activity. Sats remained at 92% on 1L during ambulation.

## 2023-12-27 NOTE — PROGRESS NOTES
RT called to room due to patient having labored breathing and SP02 in the mid 80's after ambulating to the bathroom.  Patient was placed on 4L oxymask and was given a DuoNeb which did not improve breath sounds.  WOB did improve with the oxygen and patient slowly recovered with mild abdominal breathing and accessory muscle use.  RT recommended to use bedside commode for now if tolerated.  Physician notified by RN and orders for Morphine placed.  Will continue current care plan as tolerated.

## 2023-12-28 NOTE — PROGRESS NOTES
12/28/23 1310   Appointment Info   Signing Clinician's Name / Credentials (PT) Alexandr Mc, PT, DPT   Living Environment   People in Home facility resident   Current Living Arrangements residential facility   Home Accessibility no concerns   Self-Care   Usual Activity Tolerance good   Current Activity Tolerance moderate   Equipment Currently Used at Home walker, rolling   Fall history within last six months   (Pt reports no falls but chart review indicates yes.)   General Information   Onset of Illness/Injury or Date of Surgery 12/25/23   Referring Physician Dr. Sutton   General Observations Pt and daughter present during session. Malcolm notes that he does most of his ADLs by himself. He does get assist with meals.   Cognition   Affect/Mental Status (Cognition) WFL   Orientation Status (Cognition) oriented to;person;place   Follows Commands (Cognition) WFL   Pain Assessment   Patient Currently in Pain No   Range of Motion (ROM)   ROM Comment functional UE and LE motion demonstrated today. Reports sore shoulders but does complete full motion.   Strength (Manual Muscle Testing)   Strength Comments decreased endurance. Decreased LE strength with limited ability to fully boost in bed.   Bed Mobility   Bed Mobility supine-sit;sit-supine   Supine-Sit [place] (Bed Mobility) supervision   Sit-Supine [place] (Bed Mobility) supervision   Bed Mobility Limitations decreased ability to use legs for bridging/pushing   Impairments Contributing to Impaired Bed Mobility decreased strength   Comment, (Bed Mobility) needed small boost in bed today.   Transfers   Transfers sit-stand transfer   Sit-Stand Transfer   Sit-Stand [place] (Transfers) supervision   Assistive Device (Sit-Stand Transfers) walker, 4-wheeled   Gait/Stairs (Locomotion)   [place] Level (Gait) supervision   Assistive Device (Gait) walker, 4-wheeled   Distance in Feet (Gait) 120   Pattern (Gait) 2-point   Deviations/Abnormal Patterns (Gait) gait  speed decreased   Maintains Weight-bearing Status (Gait) able to maintain   Balance   Balance Comments No loss of balance with static positioning or dynamic postures.   Clinical Impression   Criteria for Skilled Therapeutic Intervention Yes, treatment indicated   PT Diagnosis (PT) decreased endurance   Influenced by the following impairments SOB   Functional limitations due to impairments limited activity tolerance.   Clinical Presentation (PT Evaluation Complexity) stable   Clinical Decision Making (Complexity) low complexity   Planned Therapy Interventions (PT) strengthening;gait training;bed mobility training   Risk & Benefits of therapy have been explained evaluation/treatment results reviewed;daughter;patient   PT Total Evaluation Time   PT Eval, Low Complexity Minutes (16799) 15   Physical Therapy Goals   PT Frequency Daily   PT Predicted Duration/Target Date for Goal Attainment 12/30/23   PT Goals Gait   PT: Gait Greater than 200 feet   Interventions   Interventions Quick Adds Therapeutic Activity;Gait Training   Therapeutic Activity   Treatment Detail/Skilled Intervention Bed mobility with SBA but did need MIN A for boosting. Sit to stand with SBA and 4ww   Gait Training   Gait Training Minutes (21829) 15   Symptoms Noted During/After Treatment (Gait Training) shortness of breath   Treatment Detail/Skilled Intervention ambulated in hallway   Distance in Feet 150   [place] Level (Gait Training) contact guard   Physical Assistance Level (Gait Training) 1 person assist   Weight Bearing (Gait Training) full weight-bearing   Assistive Device (Gait Training) rolling walker   Pattern Analysis (Gait Training) 2-point gait   Gait Analysis Deviations decreased chance   Impairments (Gait Analysis/Training) strength decreased   PT Discharge Planning   PT Plan Continue PT   PT Discharge Recommendation (DC Rec) home with home care physical therapy;home with assist   PT Rationale for DC Rec to help return to highest  level of function   PT Brief overview of current status Malcolm did well with SBA for bed mobility and transfers. Ambulated in [place] with 4WW but did have SOB and fatigue while on room air.   Total Session Time   Timed Code Treatment Minutes 15   Total Session Time (sum of timed and untimed services) 30

## 2023-12-28 NOTE — PROGRESS NOTES
Weaned to Room Air overnight, currently 93%.  He has a congested, loose nonproductive cough.  Scheduled nebs and inhalers given, no PRN nebs this shift.  No complaints of shortness of breath this morning,

## 2023-12-28 NOTE — PROGRESS NOTES
SAFETY CHECKLIST  ID Bands and Risk clasps correct and in place (DNR, Fall risk, Allergy, Latex, Limb):  Yes  All Lines Reconciled and labeled correctly: Yes  Whiteboard updated:Yes  Environmental interventions: Yes  Verify Tele #: 4

## 2023-12-28 NOTE — PROGRESS NOTES
SAFETY CHECKLIST  ID Bands and Risk clasps correct and in place (DNR, Fall risk, Allergy, Latex, Limb):  Yes  All Lines Reconciled and labeled correctly: Yes  Whiteboard updated:Yes  Environmental interventions: Yes  Verify Tele #: ESTELA Walker RN on 12/28/2023 at 6:56 AM

## 2023-12-28 NOTE — PROGRESS NOTES
12/28/23 1221   Valuables   Patient Belongings remains with patient   Patient Belongings Remaining with Patient clothing   Did you bring any home meds/supplements to the hospital?  No     A               Admission:  I am responsible for any personal items that are not sent to the safe or pharmacy.  [facility] is not responsible for loss, theft or damage of any property in my possession.    Signature:  _________________________________ Date: _______  Time: _____                                              Staff Signature:  ____________________________ Date: ________  Time: _____      2nd Staff person, if patient is unable/unwilling to sign:    Signature: ________________________________ Date: ________  Time: _____     Discharge:  [place] has returned all of my personal belongings:    Signature: _________________________________ Date: ________  Time: _____                                          Staff Signature:  ____________________________ Date: ________  Time: _____

## 2023-12-28 NOTE — PROGRESS NOTES
Patient is on oxygen at 1 LPM via nasal cannula.     MDI, Nebulizer and flutter valve done as ordered and tolerated well.    Rayray Mercado, RT

## 2023-12-28 NOTE — PROGRESS NOTES
[facility]    Medicine Progress Note - Hospitalist Service    Date of Admission:  12/25/2023    Assessment & Plan      Community-acquired pneumonia vs COVID-19 pneumonia: Patient has elevated white count but he is chronically taking prednisone.  His procalcitonin is slightly elevated but not significant.  His chest x-ray showed pneumonia, and some effusion.  Patient was diagnosed with COVID earlier this month.  -Continue with azithromycin, cefepime  -With his increase in sob and worsening of pneumonia, plus given patient recent COVID earlier this month, concern of worsening inflammation from COVID.  Continue on Decadron.  -Supplemental oxygen as needed  -RT is working with patient  -Sputum culture, urinary strep/Legionella - pending  -Mucinex  -Encourage incentive spirometer and Acapella use.  -Continue on Lasix to help keep lungs dry and COVID patient.  -Recheck basic metabolic in a.m.    Anemia, suspect due to chronic GI blood loss:  He does not appear to have acute GI bleed but I suspect he has had some chronic GI blood loss as he has brown guaiac-positive stool. Also has iron deficiency. S/p 2 U pRBC on admit. Then 1U on 12/27 with hypoxia.  -Hold off on IV iron given infection above.  Consider outpatient iron supplement at discharge.  -H&H q12H.  -Hold Eliquis.    -consulted surgery.  If still actively bleeding or rapid drop in hemoglobin, will likely need urgent EGD and colonoscopy, otherwise can probably be done outpatient depending on clinical course.   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  -Duo neb 4 times a day and albuterol neb as needed  -Decadron as above    CAD, PAF, hypertension: Recent cardiology visit in early December has switched from Plavix and aspirin to Eliquis.  -Continue Coreg, and Imdur with hold parameters  -Hold Eliquis as above  -Continue on IV Lasix, but  "will reduce to 20mg daily since O2 is improving.    Type 2 diabetes: A1c 5.9 on 12/26  -Hold metformin and glipizide  -Increase sliding scale to high since patient is on steroid     Dementia  -Continue on Aricept and Zoloft    Hx prostate cancer  -Continue home Zytiga and hold prednisone while on Decadron above          Diet: Full Liquid Diet    DVT Prophylaxis: Pneumatic Compression Devices  Collins Catheter: Not present  Lines: None     Cardiac Monitoring: None  Code Status: No CPR- Do NOT Intubate      Clinically Significant Risk Factors              # Hypoalbuminemia: Lowest albumin = 3 g/dL at 12/26/2023  6:17 AM, will monitor as appropriate    # Coagulation Defect: INR = 1.69 (Ref range: 0.85 - 1.15) and/or PTT = N/A, will monitor for bleeding           # Overweight: Estimated body mass index is 28.22 kg/m  as calculated from the following:    Height as of this encounter: 1.727 m (5' 8\").    Weight as of this encounter: 84.2 kg (185 lb 9.6 oz)., PRESENT ON ADMISSION            Disposition Plan      Expected Discharge Date: 12/29/2023                  Zhou Sutton MD  Hospitalist Service  [facility]  Securely message with [employer] (more info)  Text page via [facility] Paging/Directory   ______________________________________________________________________    Interval History   Patient is feeling less shortness of breath and a transition to the bed to the chair without feeling winded.  He has no fever or chills.  Patient states he has some coughing and it feels wet, but unable to bring up any.  No dysuria.  Tolerating oral intake with no nausea or vomiting.    Physical Exam   Vital Signs: Temp: 97.9  F (36.6  C) Temp src: Tympanic BP: 130/76 Pulse: 81   Resp: 18 SpO2: 92 % O2 Device: None (Room air) Oxygen Delivery: 1 LPM  Weight: 185 lbs 9.6 oz    General Appearance: In nad  Respiratory: Tight with wheezy bilaterally.  Crackly at the bases bilaterally.  No rhonchi.  Cardiovascular: RRR, no " murmur.  GI: soft, NT/ND, BS+  Skin: No rash.  Neuro: A&O.  Moving his extremity in bed dependently.   Psych: Calm and pleasant    Medical Decision Making       40 MINUTES SPENT BY ME on the date of service doing chart review, history, exam, documentation & further activities per the note.      Data     I have personally reviewed the following data over the past 24 hrs:    9.5  \   8.8 (L); 8.8 (L)   / 403     136 99 29.4 (H) /  318 (H)   4.3 26 1.31 (H) \     Procal: 0.32 CRP: 95.09 (H) Lactic Acid: N/A         Imaging results reviewed over the past 24 hrs:   No results found for this or any previous visit (from the past 24 hour(s)).

## 2023-12-28 NOTE — PROGRESS NOTES
Pt woke up confused, took off his gown, pulled out his IV, took off his tele.  Tele placed back on, gown, new IV started.

## 2023-12-28 NOTE — PROGRESS NOTES
:    Met with patient to discuss discharge planning needs. Patient reported that he has had [facility] in the past.     Pt/family was given the Medicare Compare list for Home Care, with associated star ratings to assist with choice for referrals/discharge planning Yes    Education was given to pt/family that star ratings are updated/maintained by Medicare and can be reviewed by visiting www.medicare.gov Yes    Patient asked to have a referral for homecare sent to [facility]. Referral sent. Viet at [facility] reported that he needs patients discharge summary when they are discharged.     will continue to follow.     CHI Nieto on 12/28/2023 at 1:52 PM    Left voicemail with [facility] unit updating them that patient is anticipated to discharge tomorrow and asking them how patient is usually transported.    Spoke with patients daughter Lucy to update her about patients discharge plan. She stated she lives 3 hours away and is unable to transport patient back to [facility] tomorrow.      will continue to follow.     CHI Nieto on 12/28/2023 at 3:06 PM    Attempted to call [facility] again, no answer.     CHI Nieot on 12/28/2023 at 3:47 PM

## 2023-12-28 NOTE — PLAN OF CARE
"At start of shift pt was very sleepy, slept thru most cares.  Then at approx 0030 pt woke up confused and pulled out IV.  See previous note.  Pt re- draw Hgb at 2100 was 8.7   currently on room air, /78 (BP Location: Right arm, Patient Position: Semi-Hamilton's, Cuff Size: Adult Regular)   Pulse 65   Temp 97  F (36.1  C) (Tympanic)   Resp 18   Ht 1.727 m (5' 8\")   Wt 83.3 kg (183 lb 9.6 oz)   SpO2 95%   BMI 27.92 kg/m    Pt is sitting up in bed watching TV , requested coffee.  Pt states he feels like he's been in a dream.   He is Alert to self, does remember having covid, doesn't know where he is, how he got here, & doesn't know the time of year.  LS expiratory wheezing bilaterally. Denies feeling short of breath.   Has been continent of urine.      Goal Outcome Evaluation:      Plan of Care Reviewed With: patient    Overall Patient Progress: improvingOverall Patient Progress: improving        Problem: Adult Inpatient Plan of Care  Goal: Plan of Care Review  Description: The Plan of Care Review/Shift note should be completed every shift.  The Outcome Evaluation is a brief statement about your assessment that the patient is improving, declining, or no change.  This information will be displayed automatically on your shift  note.  Outcome: Progressing  Flowsheets (Taken 12/28/2023 0236)  Plan of Care Reviewed With: patient  Overall Patient Progress: improving  Goal: Patient-Specific Goal (Individualized)  Description: You can add care plan individualizations to a care plan. Examples of Individualization might be:  \"Parent requests to be called daily at 9am for status\", \"I have a hard time hearing out of my right ear\", or \"Do not touch me to wake me up as it startles  me\".  Outcome: Progressing  Flowsheets (Taken 12/28/2023 0236)  Individualized Care Needs: monitori SpO2, oxygenation needs, confusion  Anxieties, Fears or Concerns: confusion  Goal: Absence of Hospital-Acquired Illness or Injury  Outcome: " Progressing  Intervention: Identify and Manage Fall Risk  Recent Flowsheet Documentation  Taken 12/27/2023 2105 by Christa García RN  Safety Promotion/Fall Prevention:   activity supervised   assistive device/personal items within reach   clutter free environment maintained   lighting adjusted   mobility aid in reach   nonskid shoes/slippers when out of bed   room door open   room organization consistent   safety round/check completed   treat reversible contributory factors   treat underlying cause  Intervention: Prevent Skin Injury  Recent Flowsheet Documentation  Taken 12/27/2023 2105 by Christa García RN  Skin Protection:   adhesive use limited   incontinence pads utilized  Device Skin Pressure Protection:   absorbent pad utilized/changed   tubing/devices free from skin contact  Intervention: Prevent and Manage VTE (Venous Thromboembolism) Risk  Recent Flowsheet Documentation  Taken 12/27/2023 2105 by Christa García RN  VTE Prevention/Management: SCDs (sequential compression devices) on  Intervention: Prevent Infection  Recent Flowsheet Documentation  Taken 12/27/2023 2105 by Christa García RN  Infection Prevention:   cohorting utilized   hand hygiene promoted   rest/sleep promoted   single patient room provided  Goal: Optimal Comfort and Wellbeing  Outcome: Progressing  Goal: Readiness for Transition of Care  Outcome: Progressing     Problem: Anemia  Goal: Anemia Symptom Improvement  Outcome: Progressing  Intervention: Monitor and Manage Anemia  Recent Flowsheet Documentation  Taken 12/27/2023 2105 by Christa García RN  Safety Promotion/Fall Prevention:   activity supervised   assistive device/personal items within reach   clutter free environment maintained   lighting adjusted   mobility aid in reach   nonskid shoes/slippers when out of bed   room door open   room organization consistent   safety round/check completed   treat reversible contributory factors   treat underlying cause  Fatigue  Management: frequent rest breaks encouraged     Problem: Fall Injury Risk  Goal: Absence of Fall and Fall-Related Injury  Outcome: Progressing  Intervention: Identify and Manage Contributors  Recent Flowsheet Documentation  Taken 12/27/2023 2105 by Christa García RN  Medication Review/Management: medications reviewed  Intervention: Promote Injury-Free Environment  Recent Flowsheet Documentation  Taken 12/27/2023 2105 by Christa García, RN  Safety Promotion/Fall Prevention:   activity supervised   assistive device/personal items within reach   clutter free environment maintained   lighting adjusted   mobility aid in reach   nonskid shoes/slippers when out of bed   room door open   room organization consistent   safety round/check completed   treat reversible contributory factors   treat underlying cause

## 2023-12-28 NOTE — PROGRESS NOTES
Interdisciplinary Discharge Planning Note    Anticipated Discharge Date: 12/29    Anticipated Discharge Location: [facility]    Clinical Needs Before Discharge:   Medical Stability     Treatment Needs After Discharge:  homecare    Potential Barriers to Discharge: None identified     CHI Nieto  12/28/2023,  3:52 PM

## 2023-12-28 NOTE — PROGRESS NOTES
12/28/23 1200   Appointment Info   Signing Clinician's Name / Credentials (OT) Lurdes Kinney, OTR/L   Living Environment   People in Home facility resident   Current Living Arrangements assisted living   Home Accessibility no concerns   Transportation Anticipated family or friend will provide   Self-Care   Usual Activity Tolerance moderate   Current Activity Tolerance moderate   Equipment Currently Used at Home walker, standard   Fall history within last six months yes   Number of times patient has fallen within last six months 3   Cognitive Status Examination   Orientation Status orientation to person, place and time   Affect/Mental Status (Cognitive) WFL   Follows Commands WFL   Pain Assessment   Patient Currently in Pain No   Range of Motion Comprehensive   General Range of Motion bilateral upper extremity ROM WFL   Coordination   Upper Extremity Coordination No deficits were identified   Bed Mobility   Bed Mobility supine-sit   Supine-Sit [place] (Bed Mobility) supervision   Transfers   Transfers sit-stand transfer   Sit-Stand Transfer   Sit-Stand [place] (Transfers) supervision   Assistive Device (Sit-Stand Transfers) walker, front-wheeled   Clinical Impression   Criteria for Skilled Therapeutic Interventions Met (OT) Yes, treatment indicated   OT Diagnosis pneumonia   Influenced by the following impairments weakness   OT Problem List-Impairments impacting ADL activity tolerance impaired   Assessment of Occupational Performance 1-3 Performance Deficits   Identified Performance Deficits mobility and self care   Planned Therapy Interventions (OT) ADL retraining;progressive activity/exercise   Clinical Decision Making Complexity (OT) problem focused assessment/low complexity   Risk & Benefits of therapy have been explained risks/benefits reviewed   OT Total Evaluation Time   OT Eval, Low Complexity Minutes (49290) 15   OT Goals   Therapy Frequency (OT) Daily   OT Predicted Duration/Target Date for  Goal Attainment 12/30/23   OT Goals Upper Body Dressing;Lower Body Dressing;Upper Body Bathing;Lower Body Bathing;Transfers;Toilet Transfer/Toileting   OT: Upper Body Dressing Supervision/stand-by assist   OT: Lower Body Dressing Supervision/stand-by assist   OT: Upper Body Bathing Supervision/stand-by assist   OT: Lower Body Bathing Supervision/stand-by assist   OT: Transfer Supervision/stand-by assist   OT: Toilet Transfer/Toileting Supervision/stand-by assist   Interventions   Interventions Quick Adds Therapeutic Activity   Therapeutic Activities   Therapeutic Activity Minutes (90749) 15   Symptoms noted during/after treatment   (Pt ambulated in hallway and room with FWW and CGA/SBA, tolerated well, no complaints, mild SOB noted with activity with pt on room air)   Treatment Detail/Skilled Intervention see above   OT Discharge Planning   OT Plan cont OT   OT Discharge Recommendation (DC Rec) home with home care occupational therapy   OT Rationale for DC Rec Pt would benefit from home care therapies to maximize strength and endurance to maintain his baseline level of function   OT Brief overview of current status Pt progressing well, tolerated activity on room air with mild SOB   OT Equipment Needed at Discharge   (pt hs all necessary equipment)   Total Session Time   Timed Code Treatment Minutes 15   Total Session Time (sum of timed and untimed services) 30

## 2023-12-29 NOTE — PROGRESS NOTES
12/29/23 1300   Appointment Info   Signing Clinician's Name / Credentials (PT) Gila Castro, PT, DPT   Living Environment   People in Home facility resident   Current Living Arrangements residential facility   Home Accessibility no concerns   Self-Care   Usual Activity Tolerance good   Current Activity Tolerance moderate   Equipment Currently Used at Home walker, rolling   Fall history within last six months   (Pt reports no falls but chart review indicates yes.)   General Information   General Observations Pt and daughter present during session. Malcolm notes that he does most of his ADLs by himself. He does get assist with meals.   Cognition   Affect/Mental Status (Cognition) WFL   Orientation Status (Cognition) oriented to;person;place   Follows Commands (Cognition) WFL   Pain Assessment   Patient Currently in Pain No   Range of Motion (ROM)   ROM Comment functional UE and LE motion demonstrated today. Reports sore shoulders but does complete full motion.   Bed Mobility   Bed Mobility supine-sit;sit-supine   Supine-Sit [place] (Bed Mobility) supervision   Sit-Supine [place] (Bed Mobility) supervision   Bed Mobility Limitations decreased ability to use legs for bridging/pushing   Impairments Contributing to Impaired Bed Mobility decreased strength   Transfers   Transfers sit-stand transfer   Sit-Stand Transfer   Sit-Stand [place] (Transfers) supervision   Assistive Device (Sit-Stand Transfers) walker, 4-wheeled   Gait/Stairs (Locomotion)   [place] Level (Gait) supervision   Assistive Device (Gait) walker, 4-wheeled   Pattern (Gait) 2-point   Deviations/Abnormal Patterns (Gait) gait speed decreased   Maintains Weight-bearing Status (Gait) able to maintain   Clinical Impression   Criteria for Skilled Therapeutic Intervention Yes, treatment indicated   Clinical Presentation (PT Evaluation Complexity) stable   Clinical Decision Making (Complexity) low complexity   Planned Therapy  Interventions (PT) strengthening;gait training;bed mobility training   Risk & Benefits of therapy have been explained evaluation/treatment results reviewed;daughter;patient   Physical Therapy Goals   PT Frequency Daily   PT Predicted Duration/Target Date for Goal Attainment 12/30/23   PT Goals Gait   PT: Gait Greater than 200 feet   Interventions   Interventions Quick Adds Therapeutic Activity;Gait Training   Therapeutic Activity   Therapeutic Activities: dynamic activities to improve functional performance Minutes (15011) 15   Symptoms Noted During/After Treatment Fatigue   Treatment Detail/Skilled Intervention Gait belt and shoes were donned in sitting. Patient was min assist in order to complete sit to stand from recliner. After ambulation patient ambulated to the bathroom to get cleaned up. Patient was CGA for sit to stands from 4WW.   Gait Training   Gait Training Minutes (66499) 15   Symptoms Noted During/After Treatment (Gait Training) shortness of breath;fatigue   Treatment Detail/Skilled Intervention Patient ambulated 150 feet with CGA and 4WW. Patient tolerated therapy session well. Patient ambulated on room air. Patient hunches over 4WW slightly when ambulating. Patient demonstrates a bradykinetic gait speed. Patient demonstrates a shorter step length with good foot clearance. Patient fatigues and needs to return to room but does not require a rest break. Patient ambulates to recliner at the end of the therapy session. Patients call light and chair alarm are on and in place. Patient would benefit from continued skilled physical therapy in order to increase strength, balance and endurance to facilitate return to PLOF.   Distance in Feet 150   Physical Assistance Level (Gait Training) 1 person assist   Gait Analysis Deviations decreased chance   PT Discharge Planning   PT Plan Continue PT   PT Discharge Recommendation (DC Rec) home with home care physical therapy;home with assist   PT Rationale for DC Rec  to help return to highest level of function   PT Brief overview of current status Malcolm needed increased assistance in order to stand from recliner due to low height. Ambulated in [place] with 4WW but did have SOB and fatigue while on room air.   Total Session Time   Timed Code Treatment Minutes 30   Total Session Time (sum of timed and untimed services) 30

## 2023-12-29 NOTE — PROGRESS NOTES
12/29/23 1324   Appointment Info   Signing Clinician's Name / Credentials (OT) Lurdes Kinney, OTR/L   Interventions   Interventions Quick Adds Self-Care/Home Management   Self-Care/Home Management   Self-Care/Home Mgmt/ADL, Compensatory, Meal Prep Minutes (48648) 30   [place] Level (Bathing Training) stand-by assist   Assistance (Bathing Training) supervision;set-up required  (for upper and lower body sponge bathing while seated and standing at sink)   [place] Level (Upper Body Dressing Training) minimum assist (75% patient effort)   Assistance (Upper Body Dressing Training) 1 person assist   Lower Body Dressing Training Assistance maximum assist (25% patient effort)   Lower Body Dressing Training Assistance 1 person assist   Therapeutic Activities   Therapeutic Activity Minutes (30235) 15   Symptoms noted during/after treatment fatigue   Treatment Detail/Skilled Intervention Pt ambulated in hallway with FWW and CGA/SBA, tolerated well with mild fatigue/SOB   OT Discharge Planning   OT Plan cont OT   OT Discharge Recommendation (DC Rec) home with home care occupational therapy   OT Rationale for DC Rec Pt would benefit from home care therapies to maximize strength and endurance to maintain his baseline level of function   OT Brief overview of current status Pt is progressing well, tolerated increased activity today and managed self cares with min assist.   Total Session Time   Timed Code Treatment Minutes 45   Total Session Time (sum of timed and untimed services) 45

## 2023-12-29 NOTE — PLAN OF CARE
Goal Outcome Evaluation:  Pt admitted due to pneumonia of right lower lobe. Pt alert to self and has int confusion. VSS. Exp wheezes. Denies pain and SOB. Foam dressings to bilat elbows.       Plan of Care Reviewed With: patient    Overall Patient Progress: improvingOverall Patient Progress: improving

## 2023-12-29 NOTE — PROGRESS NOTES
[facility]    Medicine Progress Note - Hospitalist Service    Date of Admission:  12/25/2023    Assessment & Plan      Community-acquired pneumonia vs COVID-19 pneumonia: Patient has elevated white count but he is chronically taking prednisone.  His procalcitonin is slightly elevated but not significant.  His chest x-ray showed pneumonia, and some effusion.  Patient was diagnosed with COVID earlier this month.  -Continue with azithromycin, cefepime  -With his increase in sob and worsening of pneumonia, plus given patient recent COVID earlier this month, concern of worsening inflammation from COVID.  Continue on Decadron.  -Supplemental oxygen as needed  -RT is working with patient  -Sputum culture - pending, urinary strep/Legionella - neg  -Mucinex  -Encourage incentive spirometer and Acapella use.  -Continue on Lasix to help keep lungs dry in COVID patient.  -Recheck basic metabolic in a.m.    Anemia, suspect due to chronic GI blood loss:  He does not appear to have acute GI bleed but I suspect he has had some chronic GI blood loss as he has brown guaiac-positive stool. Also has iron deficiency. S/p 2 U pRBC on admit. Then 1U on 12/27 with hypoxia.  -Hold off on IV iron given infection above.  Outpatient iron supplement at discharge.  -H&H q12H.  -Hold Eliquis. Discussed with patient and his oldest daughter in the room regarding risk and benefits of Eliquis.  They understand that bleeding is more immediate risk compared to potential clots/strokes.  -consulted surgery.  If still actively bleeding or rapid drop in hemoglobin, will likely need urgent EGD and colonoscopy, otherwise can probably be done outpatient depending on clinical course.  Will need to follow-up outpatient endoscopy when pneumonia and COPD have resolved.  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  -Duo neb 4  "times a day and albuterol neb as needed  -Decadron as above    CAD, PAF, hypertension: Recent cardiology visit in early December has switched from Plavix and aspirin to Eliquis.  -Continue Coreg, and Imdur with hold parameters  -Hold Eliquis as above    CKD 3 -creatinine within baseline range this year.  -Cr in am    Type 2 diabetes: A1c 5.9 on 12/26  -Hold metformin and glipizide  -Continue on high sliding scale since patient is on steroid     Dementia  -Continue on Aricept and Zoloft    Hx prostate cancer  -Continue home Zytiga and hold prednisone while on Decadron above          Diet: Consistent Carbohydrate Diet Moderate Consistent Carb (60 g CHO per Meal) Diet    DVT Prophylaxis: Pneumatic Compression Devices  Collins Catheter: Not present  Lines: None     Cardiac Monitoring: None  Code Status: No CPR- Do NOT Intubate      Clinically Significant Risk Factors              # Hypoalbuminemia: Lowest albumin = 3 g/dL at 12/26/2023  6:17 AM, will monitor as appropriate              # Overweight: Estimated body mass index is 28.22 kg/m  as calculated from the following:    Height as of this encounter: 1.727 m (5' 8\").    Weight as of this encounter: 84.2 kg (185 lb 9.6 oz)., PRESENT ON ADMISSION            Disposition Plan      Expected Discharge Date: 12/30/2023                  Zhou Sutton MD  Hospitalist Service  [facility]  Securely message with [employer] (more info)  Text page via [facility] Paging/Directory   ______________________________________________________________________    Interval History   Patient seen in room with his daughter today.  Daughter mentioned that this is night and day compared to when patient came into the hospital before [facility].  Patient does not have any fever or chills.  He states that he is starting to cough up brown-colored sputum.  No nausea or vomiting.  Patient feels his appetite is improving.  No dysuria or diarrhea.    Physical Exam   Vital Signs: Temp: 97.5  F " (36.4  C) Temp src: Oral BP: 137/60 Pulse: 69   Resp: 18 SpO2: 94 % O2 Device: None (Room air)    Weight: 185 lbs 9.6 oz    General Appearance: In nad  Respiratory: Expiratory wheezy bilaterally.  Light crackly at the bases bilaterally.  No rhonchi.  Cardiovascular: RRR, no murmur.  GI: soft, NT/ND, BS+  Skin: No rash.  Neuro: A&O.  Moving his extremity in bed dependently.   Psych: Calm and pleasant    Medical Decision Making       42 MINUTES SPENT BY ME on the date of service doing chart review, history, exam, documentation & further activities per the note.      Data     I have personally reviewed the following data over the past 24 hrs:    N/A  \   8.4 (L)   / N/A     137 101 32.6 (H) /  176 (H)   4.2 26 1.38 (H) \     Procal: N/A CRP: 45.93 (H) Lactic Acid: N/A         Imaging results reviewed over the past 24 hrs:   No results found for this or any previous visit (from the past 24 hour(s)).

## 2023-12-29 NOTE — PROGRESS NOTES
:    Met with patient to update him that he will be staying for one more night likely and is anticipated to discharge tomorrow. Patient reports that his daughter Lucy is coming up from the [facility] and is able to transport him back to [facility].     Called Lucy and she confirmed that she would be able to transport patient tomorrow if he is medically cleared and discharged.     Called [facility] to update them that patient is now anticipated to discharge tomorrow and his daughter Lucy is planning on transporting patient. [facility] asked for a nurse-to-nurse to get an update about patients medical status. I notified patients nurse who stated he would contact them.     Called Viet at [facility] to update him that patient is now anticipated to discharge tomorrow. I faxed over home care orders signed by MD to [facility]. Viet asked that we fax over patients discharge summary next week.      will continue to follow.     CHI Nieto on 12/29/2023 at 11:12 AM

## 2023-12-29 NOTE — PROGRESS NOTES
Interdisciplinary Discharge Planning Note    Anticipated Discharge Date: 12/30    Anticipated Discharge Location: [facility]    Clinical Needs Before Discharge:   Medical Stability     Treatment Needs After Discharge:  homecare    Potential Barriers to Discharge: None identified     CHI Nieto  12/29/2023,  3:43 PM

## 2023-12-30 NOTE — PLAN OF CARE
"Patient resting most of the noc, minor bouts of confusion early in the shift. Later, he felt like he was dreaming. Denies pain, SOB. Assist of 1 with transfers and 4ww. Continuing with ABX therapy with possible discontinue today.   Problem: Adult Inpatient Plan of Care  Goal: Plan of Care Review  Description: The Plan of Care Review/Shift note should be completed every shift.  The Outcome Evaluation is a brief statement about your assessment that the patient is improving, declining, or no change.  This information will be displayed automatically on your shift  note.  Outcome: Progressing  Goal: Patient-Specific Goal (Individualized)  Description: You can add care plan individualizations to a care plan. Examples of Individualization might be:  \"Parent requests to be called daily at 9am for status\", \"I have a hard time hearing out of my right ear\", or \"Do not touch me to wake me up as it startles  me\".  Outcome: Progressing  Goal: Absence of Hospital-Acquired Illness or Injury  Outcome: Progressing  Intervention: Identify and Manage Fall Risk  Recent Flowsheet Documentation  Taken 12/30/2023 0302 by Andrea Chang RN  Safety Promotion/Fall Prevention:   activity supervised   safety round/check completed   supervised activity  Taken 12/29/2023 2014 by Andrea Chang RN  Safety Promotion/Fall Prevention:   activity supervised   safety round/check completed   supervised activity  Intervention: Prevent Skin Injury  Recent Flowsheet Documentation  Taken 12/29/2023 1936 by Andrea Chang RN  Body Position:   supine, head elevated   weight shifting  Intervention: Prevent and Manage VTE (Venous Thromboembolism) Risk  Recent Flowsheet Documentation  Taken 12/30/2023 0302 by Andrea Chang RN  VTE Prevention/Management: SCDs (sequential compression devices) off  Taken 12/29/2023 2014 by Andrea Chang RN  VTE Prevention/Management: SCDs (sequential compression devices) off  Goal: Optimal Comfort and Wellbeing  Outcome: " Progressing  Goal: Readiness for Transition of Care  Outcome: Progressing     Problem: Anemia  Goal: Anemia Symptom Improvement  Outcome: Progressing  Intervention: Monitor and Manage Anemia  Recent Flowsheet Documentation  Taken 12/30/2023 0302 by Andrea Chang RN  Safety Promotion/Fall Prevention:   activity supervised   safety round/check completed   supervised activity  Taken 12/29/2023 2014 by Andrea Chang RN  Safety Promotion/Fall Prevention:   activity supervised   safety round/check completed   supervised activity     Problem: Fall Injury Risk  Goal: Absence of Fall and Fall-Related Injury  Outcome: Progressing  Intervention: Identify and Manage Contributors  Recent Flowsheet Documentation  Taken 12/30/2023 0302 by Andrea Chang RN  Medication Review/Management: medications reviewed  Taken 12/29/2023 2014 by Andrea Chang RN  Medication Review/Management: medications reviewed  Intervention: Promote Injury-Free Environment  Recent Flowsheet Documentation  Taken 12/30/2023 0302 by Andrea Chang RN  Safety Promotion/Fall Prevention:   activity supervised   safety round/check completed   supervised activity  Taken 12/29/2023 2014 by Andrea Chang RN  Safety Promotion/Fall Prevention:   activity supervised   safety round/check completed   supervised activity   Goal Outcome Evaluation:

## 2023-12-30 NOTE — PROGRESS NOTES
Pharmacy:  Discharge Counseling and Medication Reconciliation    Malcolm Delgado  [address]  [facility] [address]  [phone] (home) [phone] (work)  89 year old male  PCP: No Ref-Primary, Physician    Allergies: Atorvastatin and Diclofenac    Discharge Counseling:    Pharmacist met with patient (and/or family) today to review the medication portion of the After Visit Summary (with an emphasis on NEW medications) and to address patient's questions/concerns.    Summary of Education: no Education; patient discharge to [facility]. No consultation needed     Materials Provided:  MedCounselor sheets printed from Clinical Pharmacology on: N/a    Discharge Medication Reconciliation:    It has been determined that the patient has an adequate supply of medications available or which can be obtained from the patient's preferred pharmacy, which HE/SHE has confirmed as: [facility] [An updated medication list will be faxed to the patient's pharmacy.]    Thank you for the consult.    Deepak Edwards [facility]........December 30, 2023 1:24 PM

## 2023-12-30 NOTE — PLAN OF CARE
Pt is A&Ox4 and vitally stable on RA. Pt got up to his chair for meals and the bathroom a few times. He is still getting IV antibiotics and steroids. Plan is to discharge tomorrow.      Problem: Fall Injury Risk  Goal: Absence of Fall and Fall-Related Injury  Outcome: Progressing  Intervention: Identify and Manage Contributors  Recent Flowsheet Documentation  Taken 12/29/2023 1621 by Tay Farrar, RN  Medication Review/Management: medications reviewed  Taken 12/29/2023 0759 by Tay Farrar, RN  Medication Review/Management: medications reviewed  Intervention: Promote Injury-Free Environment  Recent Flowsheet Documentation  Taken 12/29/2023 1621 by Tay Farrar, RN  Safety Promotion/Fall Prevention:   activity supervised   safety round/check completed   supervised activity  Taken 12/29/2023 0759 by Tay Farrar, RN  Safety Promotion/Fall Prevention:   activity supervised   safety round/check completed   supervised activity

## 2023-12-30 NOTE — PHARMACY
[facility]  Part of [address] [address]    December 30, 2023    Dear Pharmacist,    Your customer, Malcolm Delgado, born on 12/31/1931, was recently discharged from [facility].  We have updated his medication list and want to alert you to the following:       Review of your medicines        START taking        Dose / Directions   cefdinir 300 MG capsule  Commonly known as: OMNICEF  Used for: Pneumonia of right lower lobe due to infectious organism      Dose: 300 mg  Take 1 capsule (300 mg) by mouth 2 times daily for 5 days  Quantity: 10 capsule  Refills: 0     dexAMETHasone 6 MG tablet  Commonly known as: DECADRON  Used for: COVID-19 virus infection      Dose: 6 mg  Take 1 tablet (6 mg) by mouth daily for 6 days  Quantity: 6 tablet  Refills: 0     ferrous sulfate 142 (45 Fe) MG CR tablet  Commonly known as: SLO-FE  Used for: Anemia, unspecified type      Dose: 142 mg  Take 1 tablet (142 mg) by mouth daily for 30 days  Quantity: 30 tablet  Refills: 0     guaiFENesin 600 MG 12 hr tablet  Commonly known as: MUCINEX  Used for: Pneumonia of right lower lobe due to infectious organism      Dose: 600 mg  Take 1 tablet (600 mg) by mouth 2 times daily for 6 days  Quantity: 12 tablet  Refills: 0     pantoprazole 40 MG EC tablet  Commonly known as: PROTONIX  Used for: COVID-19 virus infection      Dose: 40 mg  Take 1 tablet (40 mg) by mouth 2 times daily for 30 days  Quantity: 60 tablet  Refills: 0            CONTINUE these medicines which may have CHANGED, or have new prescriptions. If we are uncertain of the size of tablets/capsules you have at home, strength may be listed as something that might have changed.        Dose / Directions   apixaban ANTICOAGULANT 5 MG tablet  Commonly known as: ELIQUIS  This may have changed:   additional instructions  These instructions start on January 12, 2024. If you are unsure what to do until then, ask your  doctor or other care provider.      Dose: 5 mg  Start taking on: January 12, 2024  Take 1 tablet (5 mg) by mouth 2 times daily HOLD this med until follow up with GI and may need scopes  Refills: 0     predniSONE 5 MG tablet  Commonly known as: DELTASONE  This may have changed: These instructions start on January 6, 2024. If you are unsure what to do until then, ask your doctor or other care provider.      Dose: 5 mg  Start taking on: January 6, 2024  Take 1 tablet (5 mg) by mouth 2 times daily  Refills: 0            CONTINUE these medicines which have NOT CHANGED        Dose / Directions   abiraterone 250 MG tablet  Commonly known as: ZYTIGA      Dose: 1,000 mg  Take 1,000 mg by mouth daily  Refills: 0     allopurinol 100 MG tablet  Commonly known as: ZYLOPRIM      Dose: 100 mg  Take 100 mg by mouth daily  Refills: 0     blood glucose test strip  Commonly known as: NO BRAND SPECIFIED      Dose: 1 strip  1 strip by In Vitro route 3 times daily  Refills: 0     carvedilol 6.25 MG tablet  Commonly known as: COREG      Dose: 6.25 mg  Take 6.25 mg by mouth 2 times daily (with meals)  Refills: 0     cetirizine 10 MG tablet  Commonly known as: zyrTEC      Dose: 10 mg  Take 10 mg by mouth every evening  Refills: 0     donepezil 5 MG tablet  Commonly known as: ARICEPT      Dose: 1 tablet  Take 1 tablet by mouth at bedtime  Refills: 0     fluticasone-salmeterol 500-50 MCG/ACT inhaler  Commonly known as: ADVAIR      Dose: 1 puff  Inhale 1 puff into the lungs 2 times daily Rinse after use  Refills: 0     furosemide 20 MG tablet  Commonly known as: LASIX      Dose: 20 mg  Take 20 mg by mouth daily  Refills: 0     glipiZIDE 5 MG tablet  Commonly known as: GLUCOTROL      Dose: 2.5 mg  Take 2.5 mg by mouth 2 times daily (before meals)  Refills: 0     ipratropium - albuterol 0.5 mg/2.5 mg/3 mL 0.5-2.5 (3) MG/3ML neb solution  Commonly known as: DUONEB      Dose: 1 vial  Take 1 vial by nebulization every 6 hours as needed for  shortness of breath, wheezing or cough  Refills: 0     isosorbide mononitrate 30 MG 24 hr tablet  Commonly known as: IMDUR      Dose: 30 mg  Take 30 mg by mouth daily  Refills: 0     lidocaine 5 % patch  Commonly known as: LIDODERM      Place onto the skin every 24 hours To prevent lidocaine toxicity, patient should be patch free for 12 hrs daily.  Refills: 0     melatonin 5 MG tablet      Dose: 5 mg  Take 5 mg by mouth at bedtime  Refills: 0     metFORMIN 500 MG 24 hr tablet  Commonly known as: GLUCOPHAGE XR      Dose: 1,000 mg  Take 1,000 mg by mouth daily (with dinner)  Refills: 0     multivitamin  with lutein Caps per capsule      Dose: 1 tablet  Take 1 tablet by mouth daily  Refills: 0     nitroGLYcerin 0.4 MG sublingual tablet  Commonly known as: NITROSTAT      Dose: 0.4 mg  Place 0.4 mg under the tongue every 5 minutes as needed  Refills: 0     sertraline 100 MG tablet  Commonly known as: ZOLOFT      Dose: 100 mg  Take 100 mg by mouth daily  Refills: 0     tiotropium 2.5 MCG/ACT inhaler  Commonly known as: SPIRIVA RESPIMAT      Dose: 2 puff  Inhale 2 puffs into the lungs daily  Refills: 0     Vitamin D3 25 mcg (1000 units) tablet  Commonly known as: CHOLECALCIFEROL      Dose: 50 mcg  Take 50 mcg by mouth daily  Refills: 0               Where to get your medicines        These medications were sent to [id number] - [place], [place] - [address]  [address] [facility] [address]      Phone: [phone]   cefdinir 300 MG capsule  dexAMETHasone 6 MG tablet  ferrous sulfate 142 (45 Fe) MG CR tablet  guaiFENesin 600 MG 12 hr tablet  pantoprazole 40 MG EC tablet         We also reviewed Malcolm Delgado's allergy list and updated it as needed:  Allergies: Atorvastatin and Diclofenac    Thank you for continuing to care for Malcolm Delgado.  We look forward to working together with you in the future.    Sincerely,  Deepak Edwards, [facility]

## 2023-12-30 NOTE — PLAN OF CARE
Goal Outcome Evaluation:           Overall Patient Progress: improvingOverall Patient Progress: improving         Patient continues to improve on room air, does have a congested cough but this is also improving. Vitals are stable. Tolerating solid food and liquids. Ambulating with SBA.

## 2023-12-30 NOTE — DISCHARGE SUMMARY
"[facility]  Hospitalist Discharge Summary      Date of Admission:  12/25/2023  Date of Discharge:  12/30/2023  Discharging Provider: Zhou Sutton MD  Discharge Service: Hospitalist Service    Discharge Diagnoses   Acute respiratory failure from pneumonia and COPD exacerbation with anemia likely from chronic slow GI bleed    Clinically Significant Risk Factors     # Overweight: Estimated body mass index is 28.72 kg/m  as calculated from the following:    Height as of this encounter: 1.727 m (5' 8\").    Weight as of this encounter: 85.7 kg (188 lb 14.4 oz).       Follow-ups Needed After Discharge   Follow-up Appointments     Follow-up and recommended labs and tests       Follow up with primary care provider, Physician No Ref-Primary, within 7   days to evaluate medication change, to evaluate treatment change, and for   hospital follow- up.  The following labs/tests are recommended: CBC, BMP,   CRP.  Please refer patient to GI for endoscopy.            Unresulted Labs Ordered in the Past 30 Days of this Admission       No orders found from 11/25/2023 to 12/26/2023.            Discharge Disposition   Discharged to assisted living  Condition at discharge: Stable    Hospital Course   Patient is a 89-year-old male, has history of AFib, coronary disease, diabetes, COPD/emphysema, sent from his assisted living due to shortness of breath in the setting of recent COVID-19 infection earlier the month.  In the ED workup demonstrated pneumonia with low hemoglobin.  His fecal occult blood test returned positive.  Patient was started on broad-spectrum antibiotics and has completed his course of azithromycin during his hospital stay.  Patient will continue on 5 more days of Omnicef.  His pneumonia also induces COPD to be exacerbated.  Given patient recent COVID-19 infection and elevated CRP we had placed patient on Decadron and continued this at discharge to complete a 10 days course.  While taking the prior " Decadron please hold the prednisone that patient is chronically on.  He can resume prednisone once he has completed the course of Decadron.    With regard to his anemia and likely from chronic slow GI blood loss patient had received 3 units of packed RBC and his hemoglobin remained stable afterwards.  PPI was started and his Eliquis was held and patient will continue with PPI and hold his Eliquis outpatient until further follow-up with GI.  We consulted surgery and discussed with surgeon in the hospital. Since his hemoglobins have been steady and no further bleeding, we will refer for outpatient endoscopy when patient is fully recovered from pneumonia/COVID and respiratory infection.  His iron study was low thus patient will be discharged on iron supplement.  Please refer patient to GI and PCP follow-up.    Consultations This Hospital Stay   SURGERY GENERAL IP CONSULT  SOCIAL WORK IP CONSULT  PHYSICAL THERAPY ADULT IP CONSULT  OCCUPATIONAL THERAPY ADULT IP CONSULT    Code Status   No CPR- Do NOT Intubate    Time Spent on this Encounter   IZhou MD, personally saw the patient today and spent greater than 30 minutes discharging this patient.       Zhou Sutton MD  [facility]  [address]   [place] [address]  Phone: [phone]  Fax: [phone]  ______________________________________________________________________    Physical Exam   Vital Signs: Temp: 97.8  F (36.6  C) Temp src: Tympanic BP: (!) 157/61 Pulse: 65   Resp: 16 SpO2: 91 % O2 Device: None (Room air)    Weight: 188 lbs 14.4 oz  General Appearance:  In nad  Respiratory: Not in respiratory distress.  No wheezing.  No rhonchi.  Cardiovascular: RRR  GI: ND, BS+  Skin: No rash.  Neuro: A&O.  Moving his extremity in bed dependently.   Psych: Calm and pleasant       Primary Care Physician   Physician No Ref-Primary    Discharge Orders      Home Care Referral      Reason for your hospital stay    Pneumonia with COPD  exacerbation and anemia from slow bleeding in intestine.     Follow-up and recommended labs and tests     Follow up with primary care provider, Physician No Ref-Primary, within 7 days to evaluate medication change, to evaluate treatment change, and for hospital follow- up.  The following labs/tests are recommended: CBC, BMP, CRP.  Please refer patient to GI for endoscopy.     Activity    Your activity upon discharge: activity as tolerated     Diet    Follow this diet upon discharge: Moderate Consistent Carb (60 g CHO per Meal) Diet, Cardiac, and 2g salt       Significant Results and Procedures   Results for orders placed or performed during the hospital encounter of 12/25/23   XR Chest Port 1 View    Narrative    XR CHEST PORT 1 VIEW    HISTORY: 89 yearsMale Patient with shortness of breath.  Recent  diagnosis of COVID.  Rule out pneumonia    TECHNIQUE: A single view of the chest was performed    COMPARISON: None    FINDINGS: There is airspace opacity at the right lung base. The upper  right lung and left lung are clear        Impression    IMPRESSION: Consolidating airspace opacity at the right lung base  suggestive of pneumonia.    NAPOLEON ARCHULETA MD         SYSTEM ID:  [id number]   XR Chest Port 1 View    Narrative    PROCEDURE INFORMATION:   Exam: XR Chest   Exam date and time: 12/27/2023 4:44 AM   Age: 89 years old   Clinical indication: Shortness of breath; Additional info: Worsening SOB     TECHNIQUE:   Imaging protocol: Radiologic exam of the chest.   Views: 1 view.     COMPARISON:   CR XR CHEST PORT 1 VIEW 12/25/2023 7:26 PM     FINDINGS:   Lungs: Interval increase in right perihilar and right basilar   opacity/infiltrate and small right pleural effusion. Some of this interval   increase is due to the change in position and relative expiratory nature of the   radiograph.   Pleural spaces: No pneumothorax   Heart/Mediastinum: Heart size upper limits normal to mildly enlarged, magnified   and overestimated  on the AP study.   Bones/joints: No acute abnormality.       Impression    IMPRESSION:   Interval increase in right perihilar and right basilar opacity/infiltrate and   small right pleural effusion.     THIS DOCUMENT HAS BEEN ELECTRONICALLY SIGNED BY DAMI ARANDA MD       Discharge Medications   Current Discharge Medication List        START taking these medications    Details   cefdinir (OMNICEF) 300 MG capsule Take 1 capsule (300 mg) by mouth 2 times daily for 5 days  Qty: 10 capsule, Refills: 0    Associated Diagnoses: Pneumonia of right lower lobe due to infectious organism      dexAMETHasone (DECADRON) 6 MG tablet Take 1 tablet (6 mg) by mouth daily for 6 days  Qty: 6 tablet, Refills: 0    Associated Diagnoses: COVID-19 virus infection      ferrous sulfate (SLO-FE) 142 (45 Fe) MG CR tablet Take 1 tablet (142 mg) by mouth daily for 30 days  Qty: 30 tablet, Refills: 0    Associated Diagnoses: Anemia, unspecified type      guaiFENesin (MUCINEX) 600 MG 12 hr tablet Take 1 tablet (600 mg) by mouth 2 times daily for 6 days  Qty: 12 tablet, Refills: 0    Associated Diagnoses: Pneumonia of right lower lobe due to infectious organism      pantoprazole (PROTONIX) 40 MG EC tablet Take 1 tablet (40 mg) by mouth 2 times daily for 30 days  Qty: 60 tablet, Refills: 0    Associated Diagnoses: COVID-19 virus infection           CONTINUE these medications which have CHANGED    Details   apixaban ANTICOAGULANT (ELIQUIS) 5 MG tablet Take 1 tablet (5 mg) by mouth 2 times daily HOLD this med until follow up with GI and may need scopes      predniSONE (DELTASONE) 5 MG tablet Take 1 tablet (5 mg) by mouth 2 times daily           CONTINUE these medications which have NOT CHANGED    Details   abiraterone (ZYTIGA) 250 MG tablet Take 1,000 mg by mouth daily      blood glucose (NO BRAND SPECIFIED) test strip 1 strip by In Vitro route 3 times daily      carvedilol (COREG) 6.25 MG tablet Take 6.25 mg by mouth 2 times daily (with meals)       furosemide (LASIX) 20 MG tablet Take 20 mg by mouth daily      glipiZIDE (GLUCOTROL) 5 MG tablet Take 2.5 mg by mouth 2 times daily (before meals)      ipratropium - albuterol 0.5 mg/2.5 mg/3 mL (DUONEB) 0.5-2.5 (3) MG/3ML neb solution Take 1 vial by nebulization every 6 hours as needed for shortness of breath, wheezing or cough      isosorbide mononitrate (IMDUR) 30 MG 24 hr tablet Take 30 mg by mouth daily      lidocaine (LIDODERM) 5 % patch Place onto the skin every 24 hours To prevent lidocaine toxicity, patient should be patch free for 12 hrs daily.      metFORMIN (GLUCOPHAGE XR) 500 MG 24 hr tablet Take 1,000 mg by mouth daily (with dinner)      multivitamin  with lutein (OCUVITE WITH LUTEIN) CAPS per capsule Take 1 tablet by mouth daily      nitroGLYcerin (NITROSTAT) 0.4 MG sublingual tablet Place 0.4 mg under the tongue every 5 minutes as needed      sertraline (ZOLOFT) 100 MG tablet Take 100 mg by mouth daily      tiotropium (SPIRIVA RESPIMAT) 2.5 MCG/ACT inhaler Inhale 2 puffs into the lungs daily      allopurinol (ZYLOPRIM) 100 MG tablet Take 100 mg by mouth daily      cetirizine (ZYRTEC) 10 MG tablet Take 10 mg by mouth every evening      donepezil (ARICEPT) 5 MG tablet Take 1 tablet by mouth at bedtime      fluticasone-salmeterol (ADVAIR) 500-50 MCG/ACT inhaler Inhale 1 puff into the lungs 2 times daily Rinse after use      melatonin 5 MG tablet Take 5 mg by mouth at bedtime      Vitamin D3 (CHOLECALCIFEROL) 25 mcg (1000 units) tablet Take 50 mcg by mouth daily           Allergies   Allergies   Allergen Reactions    Atorvastatin Angioedema    Diclofenac Diarrhea

## 2023-12-30 NOTE — PROGRESS NOTES
Pt remains on RA. Neb given this morning, pt was unavailable this afternoon. No further respiratory interventions. Beckie Chen RRT

## 2024-01-01 ENCOUNTER — APPOINTMENT (OUTPATIENT)
Dept: GENERAL RADIOLOGY | Facility: OTHER | Age: 89
DRG: 280 | End: 2024-01-01
Attending: FAMILY MEDICINE
Payer: MEDICARE

## 2024-01-01 ENCOUNTER — DOCUMENTATION ONLY (OUTPATIENT)
Dept: OTHER | Facility: CLINIC | Age: 89
End: 2024-01-01
Payer: COMMERCIAL

## 2024-01-01 ENCOUNTER — PATIENT OUTREACH (OUTPATIENT)
Dept: FAMILY MEDICINE | Facility: OTHER | Age: 89
End: 2024-01-01
Payer: COMMERCIAL

## 2024-01-01 ENCOUNTER — HOSPITAL ENCOUNTER (INPATIENT)
Facility: OTHER | Age: 89
LOS: 1 days | Discharge: HOSPICE | DRG: 280 | End: 2024-04-13
Attending: FAMILY MEDICINE
Payer: MEDICARE

## 2024-01-01 VITALS
TEMPERATURE: 98.2 F | OXYGEN SATURATION: 98 % | RESPIRATION RATE: 19 BRPM | HEIGHT: 68 IN | DIASTOLIC BLOOD PRESSURE: 49 MMHG | HEART RATE: 99 BPM | WEIGHT: 200 LBS | SYSTOLIC BLOOD PRESSURE: 104 MMHG | BODY MASS INDEX: 30.31 KG/M2

## 2024-01-01 DIAGNOSIS — Z51.5 COMFORT MEASURES ONLY STATUS: Primary | ICD-10-CM

## 2024-01-01 DIAGNOSIS — I21.4 NSTEMI (NON-ST ELEVATED MYOCARDIAL INFARCTION) (H): ICD-10-CM

## 2024-01-01 LAB
ANION GAP SERPL CALCULATED.3IONS-SCNC: 11 MMOL/L (ref 7–15)
ATRIAL RATE - MUSE: 58 BPM
BASOPHILS # BLD AUTO: 0 10E3/UL (ref 0–0.2)
BASOPHILS NFR BLD AUTO: 0 %
BUN SERPL-MCNC: 24.6 MG/DL (ref 8–23)
CALCIUM SERPL-MCNC: 8.8 MG/DL (ref 8.2–9.6)
CHLORIDE SERPL-SCNC: 102 MMOL/L (ref 98–107)
CREAT SERPL-MCNC: 1.27 MG/DL (ref 0.67–1.17)
DEPRECATED HCO3 PLAS-SCNC: 25 MMOL/L (ref 22–29)
DIASTOLIC BLOOD PRESSURE - MUSE: NORMAL MMHG
EGFRCR SERPLBLD CKD-EPI 2021: 53 ML/MIN/1.73M2
EOSINOPHIL # BLD AUTO: 0 10E3/UL (ref 0–0.7)
EOSINOPHIL NFR BLD AUTO: 0 %
ERYTHROCYTE [DISTWIDTH] IN BLOOD BY AUTOMATED COUNT: 19.1 % (ref 10–15)
FLUAV RNA SPEC QL NAA+PROBE: NEGATIVE
FLUBV RNA RESP QL NAA+PROBE: NEGATIVE
GLUCOSE SERPL-MCNC: 111 MG/DL (ref 70–99)
HCT VFR BLD AUTO: 26.7 % (ref 40–53)
HGB BLD-MCNC: 8 G/DL (ref 13.3–17.7)
IMM GRANULOCYTES # BLD: 0.1 10E3/UL
IMM GRANULOCYTES NFR BLD: 0 %
INR PPP: 1.22 (ref 0.85–1.15)
INTERPRETATION ECG - MUSE: NORMAL
LYMPHOCYTES # BLD AUTO: 1 10E3/UL (ref 0.8–5.3)
LYMPHOCYTES NFR BLD AUTO: 9 %
MCH RBC QN AUTO: 25.6 PG (ref 26.5–33)
MCHC RBC AUTO-ENTMCNC: 30 G/DL (ref 31.5–36.5)
MCV RBC AUTO: 85 FL (ref 78–100)
MONOCYTES # BLD AUTO: 1.2 10E3/UL (ref 0–1.3)
MONOCYTES NFR BLD AUTO: 11 %
NEUTROPHILS # BLD AUTO: 9.1 10E3/UL (ref 1.6–8.3)
NEUTROPHILS NFR BLD AUTO: 80 %
NRBC # BLD AUTO: 0 10E3/UL
NRBC BLD AUTO-RTO: 0 /100
NT-PROBNP SERPL-MCNC: ABNORMAL PG/ML (ref 0–1800)
P AXIS - MUSE: 20 DEGREES
PLATELET # BLD AUTO: 175 10E3/UL (ref 150–450)
POTASSIUM SERPL-SCNC: 3.7 MMOL/L (ref 3.4–5.3)
PR INTERVAL - MUSE: 280 MS
QRS DURATION - MUSE: 98 MS
QT - MUSE: 542 MS
QTC - MUSE: 532 MS
R AXIS - MUSE: -17 DEGREES
RBC # BLD AUTO: 3.13 10E6/UL (ref 4.4–5.9)
RSV RNA SPEC NAA+PROBE: NEGATIVE
SARS-COV-2 RNA RESP QL NAA+PROBE: NEGATIVE
SODIUM SERPL-SCNC: 138 MMOL/L (ref 135–145)
SYSTOLIC BLOOD PRESSURE - MUSE: NORMAL MMHG
T AXIS - MUSE: 213 DEGREES
TROPONIN T SERPL HS-MCNC: 2661 NG/L
VENTRICULAR RATE- MUSE: 58 BPM
WBC # BLD AUTO: 11.4 10E3/UL (ref 4–11)

## 2024-01-01 PROCEDURE — 87637 SARSCOV2&INF A&B&RSV AMP PRB: CPT | Performed by: FAMILY MEDICINE

## 2024-01-01 PROCEDURE — 99285 EMERGENCY DEPT VISIT HI MDM: CPT | Performed by: FAMILY MEDICINE

## 2024-01-01 PROCEDURE — 93005 ELECTROCARDIOGRAM TRACING: CPT | Performed by: FAMILY MEDICINE

## 2024-01-01 PROCEDURE — 85610 PROTHROMBIN TIME: CPT | Performed by: FAMILY MEDICINE

## 2024-01-01 PROCEDURE — 99285 EMERGENCY DEPT VISIT HI MDM: CPT | Mod: 25 | Performed by: FAMILY MEDICINE

## 2024-01-01 PROCEDURE — 93010 ELECTROCARDIOGRAM REPORT: CPT | Performed by: INTERNAL MEDICINE

## 2024-01-01 PROCEDURE — 71045 X-RAY EXAM CHEST 1 VIEW: CPT

## 2024-01-01 PROCEDURE — 250N000009 HC RX 250: Performed by: FAMILY MEDICINE

## 2024-01-01 PROCEDURE — 85025 COMPLETE CBC W/AUTO DIFF WBC: CPT | Performed by: FAMILY MEDICINE

## 2024-01-01 PROCEDURE — 99239 HOSP IP/OBS DSCHRG MGMT >30: CPT | Performed by: FAMILY MEDICINE

## 2024-01-01 PROCEDURE — 999N000157 HC STATISTIC RCP TIME EA 10 MIN

## 2024-01-01 PROCEDURE — 84484 ASSAY OF TROPONIN QUANT: CPT | Performed by: FAMILY MEDICINE

## 2024-01-01 PROCEDURE — 36415 COLL VENOUS BLD VENIPUNCTURE: CPT | Performed by: FAMILY MEDICINE

## 2024-01-01 PROCEDURE — 99222 1ST HOSP IP/OBS MODERATE 55: CPT | Mod: AI | Performed by: FAMILY MEDICINE

## 2024-01-01 PROCEDURE — 83880 ASSAY OF NATRIURETIC PEPTIDE: CPT | Performed by: FAMILY MEDICINE

## 2024-01-01 PROCEDURE — 250N000013 HC RX MED GY IP 250 OP 250 PS 637: Performed by: FAMILY MEDICINE

## 2024-01-01 PROCEDURE — 94640 AIRWAY INHALATION TREATMENT: CPT

## 2024-01-01 PROCEDURE — 120N000001 HC R&B MED SURG/OB

## 2024-01-01 PROCEDURE — 82374 ASSAY BLOOD CARBON DIOXIDE: CPT | Performed by: FAMILY MEDICINE

## 2024-01-01 RX ORDER — LORAZEPAM 2 MG/ML
1-2 INJECTION INTRAMUSCULAR
Status: DISCONTINUED | OUTPATIENT
Start: 2024-01-01 | End: 2024-01-01 | Stop reason: HOSPADM

## 2024-01-01 RX ORDER — FERROUS SULFATE 325(65) MG
1 TABLET ORAL EVERY OTHER DAY
Status: ON HOLD | COMMUNITY
Start: 2024-01-01 | End: 2024-01-01

## 2024-01-01 RX ORDER — MORPHINE SULFATE 100 MG/5ML
10 SOLUTION ORAL
Status: DISCONTINUED | OUTPATIENT
Start: 2024-01-01 | End: 2024-01-01 | Stop reason: HOSPADM

## 2024-01-01 RX ORDER — ACETAMINOPHEN 325 MG/1
650 TABLET ORAL EVERY 6 HOURS PRN
Status: DISCONTINUED | OUTPATIENT
Start: 2024-01-01 | End: 2024-01-01 | Stop reason: HOSPADM

## 2024-01-01 RX ORDER — LIDOCAINE 40 MG/G
CREAM TOPICAL
Status: DISCONTINUED | OUTPATIENT
Start: 2024-01-01 | End: 2024-01-01 | Stop reason: HOSPADM

## 2024-01-01 RX ORDER — ONDANSETRON 2 MG/ML
4 INJECTION INTRAMUSCULAR; INTRAVENOUS EVERY 6 HOURS PRN
Status: DISCONTINUED | OUTPATIENT
Start: 2024-01-01 | End: 2024-01-01 | Stop reason: HOSPADM

## 2024-01-01 RX ORDER — AMOXICILLIN 250 MG
1 CAPSULE ORAL 2 TIMES DAILY PRN
Status: DISCONTINUED | OUTPATIENT
Start: 2024-01-01 | End: 2024-01-01 | Stop reason: HOSPADM

## 2024-01-01 RX ORDER — IBUPROFEN 200 MG
1 CAPSULE ORAL DAILY
Status: ON HOLD | COMMUNITY
Start: 2024-01-01 | End: 2024-01-01

## 2024-01-01 RX ORDER — MORPHINE SULFATE 100 MG/5ML
5-10 SOLUTION ORAL
Qty: 30 ML | Refills: 0 | Status: SHIPPED | OUTPATIENT
Start: 2024-01-01

## 2024-01-01 RX ORDER — PANTOPRAZOLE SODIUM 40 MG/1
40 TABLET, DELAYED RELEASE ORAL DAILY
Status: ON HOLD | COMMUNITY
Start: 2024-01-01 | End: 2024-01-01

## 2024-01-01 RX ORDER — BISACODYL 10 MG
10 SUPPOSITORY, RECTAL RECTAL
Status: DISCONTINUED | OUTPATIENT
Start: 2024-01-01 | End: 2024-01-01 | Stop reason: HOSPADM

## 2024-01-01 RX ORDER — POTASSIUM CHLORIDE 750 MG/1
10 TABLET, EXTENDED RELEASE ORAL DAILY
Status: ON HOLD | COMMUNITY
Start: 2024-01-01 | End: 2024-01-01

## 2024-01-01 RX ORDER — ONDANSETRON 4 MG/1
4 TABLET, ORALLY DISINTEGRATING ORAL EVERY 6 HOURS PRN
Status: DISCONTINUED | OUTPATIENT
Start: 2024-01-01 | End: 2024-01-01 | Stop reason: HOSPADM

## 2024-01-01 RX ORDER — CALCIUM CARBONATE 500 MG/1
1000 TABLET, CHEWABLE ORAL 4 TIMES DAILY PRN
Status: DISCONTINUED | OUTPATIENT
Start: 2024-01-01 | End: 2024-01-01 | Stop reason: HOSPADM

## 2024-01-01 RX ORDER — MORPHINE SULFATE 100 MG/5ML
5 SOLUTION ORAL
Status: DISCONTINUED | OUTPATIENT
Start: 2024-01-01 | End: 2024-01-01 | Stop reason: HOSPADM

## 2024-01-01 RX ORDER — IPRATROPIUM BROMIDE AND ALBUTEROL SULFATE 2.5; .5 MG/3ML; MG/3ML
1 SOLUTION RESPIRATORY (INHALATION) EVERY 6 HOURS PRN
Status: DISCONTINUED | OUTPATIENT
Start: 2024-01-01 | End: 2024-01-01 | Stop reason: HOSPADM

## 2024-01-01 RX ORDER — GLIPIZIDE 10 MG/1
2 TABLET ORAL EVERY 8 HOURS PRN
Status: DISCONTINUED | OUTPATIENT
Start: 2024-01-01 | End: 2024-01-01 | Stop reason: HOSPADM

## 2024-01-01 RX ORDER — AMOXICILLIN 250 MG
2 CAPSULE ORAL 2 TIMES DAILY PRN
Status: DISCONTINUED | OUTPATIENT
Start: 2024-01-01 | End: 2024-01-01 | Stop reason: HOSPADM

## 2024-01-01 RX ORDER — ASCORBIC ACID 500 MG
1 TABLET ORAL DAILY
Status: ON HOLD | COMMUNITY
Start: 2024-01-01 | End: 2024-01-01

## 2024-01-01 RX ORDER — NALOXONE HYDROCHLORIDE 0.4 MG/ML
0.2 INJECTION, SOLUTION INTRAMUSCULAR; INTRAVENOUS; SUBCUTANEOUS
Status: DISCONTINUED | OUTPATIENT
Start: 2024-01-01 | End: 2024-01-01 | Stop reason: HOSPADM

## 2024-01-01 RX ORDER — NALOXONE HYDROCHLORIDE 0.4 MG/ML
0.1 INJECTION, SOLUTION INTRAMUSCULAR; INTRAVENOUS; SUBCUTANEOUS
Status: DISCONTINUED | OUTPATIENT
Start: 2024-01-01 | End: 2024-01-01 | Stop reason: HOSPADM

## 2024-01-01 RX ORDER — MINERAL OIL/HYDROPHIL PETROLAT
OINTMENT (GRAM) TOPICAL EVERY 8 HOURS PRN
Status: DISCONTINUED | OUTPATIENT
Start: 2024-01-01 | End: 2024-01-01 | Stop reason: HOSPADM

## 2024-01-01 RX ORDER — LISINOPRIL 2.5 MG/1
1 TABLET ORAL DAILY
Status: ON HOLD | COMMUNITY
Start: 2024-01-01 | End: 2024-01-01

## 2024-01-01 RX ADMIN — SERTRALINE HYDROCHLORIDE 100 MG: 50 TABLET ORAL at 09:20

## 2024-01-01 RX ADMIN — MORPHINE SULFATE 5 MG: 100 SOLUTION ORAL at 01:55

## 2024-01-01 RX ADMIN — IPRATROPIUM BROMIDE AND ALBUTEROL SULFATE 3 ML: .5; 3 SOLUTION RESPIRATORY (INHALATION) at 05:42

## 2024-01-01 ASSESSMENT — ACTIVITIES OF DAILY LIVING (ADL)
ADLS_ACUITY_SCORE: 34
ADLS_ACUITY_SCORE: 38
ADLS_ACUITY_SCORE: 34
ADLS_ACUITY_SCORE: 38
ADLS_ACUITY_SCORE: 34
ADLS_ACUITY_SCORE: 38
ADLS_ACUITY_SCORE: 34
ADLS_ACUITY_SCORE: 34
ADLS_ACUITY_SCORE: 38
ADLS_ACUITY_SCORE: 34
ADLS_ACUITY_SCORE: 34
ADLS_ACUITY_SCORE: 38
ADLS_ACUITY_SCORE: 34

## 2024-01-01 ASSESSMENT — ENCOUNTER SYMPTOMS
FEVER: 0
CHILLS: 1
COUGH: 1
SHORTNESS OF BREATH: 1

## 2024-01-01 ASSESSMENT — COLUMBIA-SUICIDE SEVERITY RATING SCALE - C-SSRS
6. HAVE YOU EVER DONE ANYTHING, STARTED TO DO ANYTHING, OR PREPARED TO DO ANYTHING TO END YOUR LIFE?: NO
1. IN THE PAST MONTH, HAVE YOU WISHED YOU WERE DEAD OR WISHED YOU COULD GO TO SLEEP AND NOT WAKE UP?: NO
2. HAVE YOU ACTUALLY HAD ANY THOUGHTS OF KILLING YOURSELF IN THE PAST MONTH?: NO

## 2024-01-02 NOTE — TELEPHONE ENCOUNTER
Call placed to [facility]. They are looking into referral and will call back.     Call placed to [facility] and spoke with Nurse Cordero. She will reach out to family, and have them call to clinic regarding appointment confirmation. She states pt's PCP is not here at [facility], but will confirm with family and call us back.      Beckie Michaels RN on 1/2/2024 at 8:25 AM

## 2024-01-02 NOTE — PROGRESS NOTES
:     In patient's chart to fax discharge summary and face-to-face to [facility].     CHI Awad on 1/2/2024 at 9:14 AM

## 2024-01-02 NOTE — TELEPHONE ENCOUNTER
Patient has PCP elsewhere, no follow-up here. No TCM call required per policy.    Beckie Michaels RN on 1/2/2024 at 9:10 AM

## 2024-01-02 NOTE — TELEPHONE ENCOUNTER
[facility] [phone]  Carrie Cifuentes minutes ago (8:59 AM)     Family decided to cancel appointment

## 2024-04-12 PROBLEM — N18.31 STAGE 3A CHRONIC KIDNEY DISEASE (H): Chronic | Status: ACTIVE | Noted: 2023-01-01

## 2024-04-12 PROBLEM — I48.20 CHRONIC ATRIAL FIBRILLATION (H): Status: ACTIVE | Noted: 2023-01-01

## 2024-04-12 PROBLEM — D64.9 ANEMIA, UNSPECIFIED TYPE: Status: ACTIVE | Noted: 2023-01-01

## 2024-04-12 PROBLEM — I25.10 CORONARY ARTERY DISEASE INVOLVING NATIVE CORONARY ARTERY OF NATIVE HEART: Status: ACTIVE | Noted: 2022-02-28

## 2024-04-12 PROBLEM — E11.29 TYPE 2 DIABETES MELLITUS WITH KIDNEY COMPLICATION, WITHOUT LONG-TERM CURRENT USE OF INSULIN (H): Status: ACTIVE | Noted: 2023-01-01

## 2024-04-12 PROBLEM — Z51.5 COMFORT MEASURES ONLY STATUS: Status: ACTIVE | Noted: 2023-01-01

## 2024-04-12 PROBLEM — I21.4 NSTEMI (NON-ST ELEVATED MYOCARDIAL INFARCTION) (H): Status: ACTIVE | Noted: 2023-01-01

## 2024-04-12 PROBLEM — J96.01 ACUTE RESPIRATORY FAILURE WITH HYPOXIA (H): Status: ACTIVE | Noted: 2024-01-01

## 2024-04-12 NOTE — ED PROVIDER NOTES
History     Chief Complaint   Patient presents with    Chest Pain    Abdominal Pain     The history is provided by the patient, the spouse and medical records.     Malcolm Delgado is a 89 year old male here by EMS from [facility]. He has had chest pain, sweats, upset stomach, cough and SOB for the past two days. No fall or injury. Staff at [facility] told his wife he did not have fever.    He has COPD (on Duo Neb, Advair, Spiriva), history of alcohol abuse, heart disease including HTN, NSTEMI, atrial flutter/fib, NSTEMI (on Eliquis), CKD, DM, prostate cancer, iron deficiency anemia.    Allergies:  Allergies   Allergen Reactions    Atorvastatin Angioedema    Diclofenac Diarrhea       Problem List:    Patient Active Problem List    Diagnosis Date Noted    Chronic atrial fibrillation (H) 2023     Priority: Medium    NSTEMI (non-ST elevated myocardial infarction) (H) 2023     Priority: Medium    Pneumonia of right lower lobe due to infectious organism 2023     Priority: Medium    Anemia, unspecified type 2023     Priority: Medium    COVID-19 virus infection 2023     Priority: Medium    Comfort measures only status 2023     Priority: Medium        Past Medical History:    No past medical history on file.    Past Surgical History:    No past surgical history on file.    Family History:    No family history on file.    Social History:  Marital Status:   [2]  Social History     Tobacco Use    Smoking status: Former     Current packs/day: 0.00     Types: Cigarettes     Quit date: 1962     Years since quittin.3    Smokeless tobacco: Never   Vaping Use    Vaping status: Never Used   Substance Use Topics    Alcohol use: Yes     Alcohol/week: 4.0 standard drinks of alcohol     Types: 4 Standard drinks or equivalent per week    Drug use: Never        Medications:    abiraterone (ZYTIGA) 250 MG tablet  allopurinol (ZYLOPRIM) 100 MG tablet  apixaban ANTICOAGULANT  "(ELIQUIS) 5 MG tablet  blood glucose (NO BRAND SPECIFIED) test strip  carvedilol (COREG) 6.25 MG tablet  cetirizine (ZYRTEC) 10 MG tablet  dexAMETHasone (DECADRON) 6 MG tablet  donepezil (ARICEPT) 5 MG tablet  fluticasone-salmeterol (ADVAIR) 500-50 MCG/ACT inhaler  furosemide (LASIX) 20 MG tablet  glipiZIDE (GLUCOTROL) 5 MG tablet  ipratropium - albuterol 0.5 mg/2.5 mg/3 mL (DUONEB) 0.5-2.5 (3) MG/3ML neb solution  isosorbide mononitrate (IMDUR) 30 MG 24 hr tablet  lidocaine (LIDODERM) 5 % patch  melatonin 5 MG tablet  metFORMIN (GLUCOPHAGE XR) 500 MG 24 hr tablet  multivitamin  with lutein (OCUVITE WITH LUTEIN) CAPS per capsule  nitroGLYcerin (NITROSTAT) 0.4 MG sublingual tablet  predniSONE (DELTASONE) 5 MG tablet  sertraline (ZOLOFT) 100 MG tablet  tiotropium (SPIRIVA RESPIMAT) 2.5 MCG/ACT inhaler  Vitamin D3 (CHOLECALCIFEROL) 25 mcg (1000 units) tablet      Review of Systems   Constitutional:  Positive for chills. Negative for fever.   Respiratory:  Positive for cough and shortness of breath.    Cardiovascular:  Positive for chest pain.   All other systems reviewed and are negative.      Physical Exam   BP: 104/46  Pulse: 60  Temp: 99.9  F (37.7  C)  Resp: 16  Height: 172.7 cm (5' 8\")  Weight: 90.7 kg (200 lb)  SpO2: 95 %      Physical Exam  Vitals and nursing note reviewed.   Constitutional:       General: He is not in acute distress.     Appearance: He is obese. He is not ill-appearing.   Cardiovascular:      Rate and Rhythm: Normal rate and regular rhythm.      Heart sounds: Normal heart sounds.   Pulmonary:      Effort: Pulmonary effort is normal. No respiratory distress.      Breath sounds: Normal breath sounds.   Chest:      Chest wall: No mass, tenderness or crepitus.   Abdominal:      General: Bowel sounds are normal.      Palpations: Abdomen is soft.      Tenderness: There is no abdominal tenderness.   Musculoskeletal:      Right lower leg: No tenderness. Edema present.      Left lower leg: No " tenderness. Edema present.   Skin:     General: Skin is warm and dry.   Neurological:      General: No focal deficit present.      Mental Status: He is alert and oriented to person, place, and time.       EKG: sinus bradycardia, 1st degree block, rate 58, new T wave inversion in anterior/ lateral leads compared to Dec 2023.    Results for orders placed or performed during the hospital encounter of 04/12/24 (from the past 24 hour(s))   Symptomatic Influenza A/B, RSV, & SARS-CoV2 PCR (COVID-19) Nose    Specimen: Nose; Swab   Result Value Ref Range    Influenza A PCR Negative Negative    Influenza B PCR Negative Negative    RSV PCR Negative Negative    SARS CoV2 PCR Negative Negative    Narrative    Testing was performed using the Xpert Xpress CoV2/Flu/RSV Assay on the Cepheid GeneXpert Instrument. This test should be ordered for the detection of SARS-CoV-2, influenza, and RSV viruses in individuals who meet clinical and/or epidemiological criteria. Test performance is unknown in asymptomatic patients. This test is for in vitro diagnostic use under the FDA EUA for laboratories certified under CLIA to perform high or moderate complexity testing. This test has not been FDA cleared or approved. A negative result does not rule out the presence of PCR inhibitors in the specimen or target RNA in concentration below the limit of detection for the assay. If only one viral target is positive but coinfection with multiple targets is suspected, the sample should be re-tested with another FDA cleared, approved, or authorized test, if coinfection would change clinical management. This test was validated by the [facility] [employer]. These laboratories are certified under the Clinical Laboratory Improvement Amendments of 1988 (CLIA-88) as qualified to perform high complexity laboratory testing.   CBC with platelets differential    Narrative    The following orders were created for panel order CBC with platelets  differential.  Procedure                               Abnormality         Status                     ---------                               -----------         ------                     CBC with platelets and d...[[id number]]  Abnormal            Final result                 Please view results for these tests on the individual orders.   Basic metabolic panel   Result Value Ref Range    Sodium 138 135 - 145 mmol/L    Potassium 3.7 3.4 - 5.3 mmol/L    Chloride 102 98 - 107 mmol/L    Carbon Dioxide (CO2) 25 22 - 29 mmol/L    Anion Gap 11 7 - 15 mmol/L    Urea Nitrogen 24.6 (H) 8.0 - 23.0 mg/dL    Creatinine 1.27 (H) 0.67 - 1.17 mg/dL    GFR Estimate 53 (L) >60 mL/min/1.73m2    Calcium 8.8 8.2 - 9.6 mg/dL    Glucose 111 (H) 70 - 99 mg/dL   INR   Result Value Ref Range    INR 1.22 (H) 0.85 - 1.15   Troponin T, High Sensitivity   Result Value Ref Range    Troponin T, High Sensitivity 2,661 (HH) <=22 ng/L   Nt probnp inpatient (BNP)   Result Value Ref Range    N terminal Pro BNP Inpatient 18,948 (H) 0 - 1,800 pg/mL   CBC with platelets and differential   Result Value Ref Range    WBC Count 11.4 (H) 4.0 - 11.0 10e3/uL    RBC Count 3.13 (L) 4.40 - 5.90 10e6/uL    Hemoglobin 8.0 (L) 13.3 - 17.7 g/dL    Hematocrit 26.7 (L) 40.0 - 53.0 %    MCV 85 78 - 100 fL    MCH 25.6 (L) 26.5 - 33.0 pg    MCHC 30.0 (L) 31.5 - 36.5 g/dL    RDW 19.1 (H) 10.0 - 15.0 %    Platelet Count 175 150 - 450 10e3/uL    % Neutrophils 80 %    % Lymphocytes 9 %    % Monocytes 11 %    % Eosinophils 0 %    % Basophils 0 %    % Immature Granulocytes 0 %    NRBCs per 100 WBC 0 <1 /100    Absolute Neutrophils 9.1 (H) 1.6 - 8.3 10e3/uL    Absolute Lymphocytes 1.0 0.8 - 5.3 10e3/uL    Absolute Monocytes 1.2 0.0 - 1.3 10e3/uL    Absolute Eosinophils 0.0 0.0 - 0.7 10e3/uL    Absolute Basophils 0.0 0.0 - 0.2 10e3/uL    Absolute Immature Granulocytes 0.1 <=0.4 10e3/uL    Absolute NRBCs 0.0 10e3/uL   XR Chest Port 1 View    Narrative    PROCEDURE:  XR CHEST PORT 1  "VIEW    HISTORY: chest pain. .    COMPARISON:  12/27/2023    FINDINGS:    The cardiomediastinal contours are enlarged.  Hazy bibasilar opacities are seen. Interstitial thickening and  vascular cephalization is noted. No pneumothorax is seen.      Impression    IMPRESSION:      Bibasilar infiltrates versus layering small effusions.      Cardiomegaly. Probable interstitial edema.    ARSH HEALY MD         SYSTEM ID:  [id number]         Assessments & Plan (with Medical Decision Making)  Malcolm Delgado is a 89 year old male here by EMS from [facility]. He has had chest pain, sweats, upset stomach, cough and SOB for the past two days. No fall or injury. Staff at [facility] told his wife he did not have fever.  He has COPD (on Duo Neb, Advair, Spiriva), history of alcohol abuse, heart disease including HTN, NSTEMI, atrial flutter/fib, NSTEMI (on Eliquis), CKD, DM, prostate cancer, iron deficiency anemia.   VS in the ED on room air /56   Pulse 61   Temp 99.9  F (37.7  C) (Tympanic)   Resp 16   Ht 1.727 m (5' 8\")   Wt 90.7 kg (200 lb)   SpO2 97%   BMI 30.41 kg/m    Exam shows no focal findings.  EKG shows new T wave inversion in anterior and lateral leads compared to Dec 2023.  Labs show CBC with WBC 11,400, hgb 8.0 (stable), BMP with Cr 1.27 (stable), troponin 2661, INR 1.22, BNP 18,948, 4 Plex negative.  Chest xray shows bibasilar infiltrates versus layering small effusions, cardiomegaly, and probable interstitial edema.  2:29 PM  I spoke with his two daughters about this- this will be a life ending event for him at some point. He looks comfortable at this time but is on 2 L oxygen.  LEONARD Velasquez, called [facility] and they will need Hospice up and running before they can take Malcolm back on oxygen.  We will keep him here until that time.   3:33 PM I spoke with Dr Weinstein who will keep him until Hospice is set up.      I have reviewed the nursing notes.    I have reviewed the findings, " diagnosis, plan and need for follow up with the patient.  Medical Decision Making  The patient's presentation was of high complexity (a chronic illness severe exacerbation, progression, or side effect of treatment).    The patient's evaluation involved:  an assessment requiring an independent historian (see separate area of note for details)  ordering and/or review of 3+ test(s) in this encounter (see separate area of note for details)    The patient's management necessitated high risk (a decision regarding hospitalization).    Final diagnoses:   NSTEMI (non-ST elevated myocardial infarction) (H)       4/12/2024   [facility] MD  04/12/24 1959

## 2024-04-12 NOTE — PROGRESS NOTES
"[facility] ADMISSION NOTE    Patient admitted to room 311 at approximately 1655 via bed from emergency room. Patient was accompanied by daughter.     Verbal SBAR report received from ENRICO Butts prior to patient arrival.     Patient ambulated to bed with one assist. Patient alert and oriented X 2. The patient is not having any pain.  . Admission vital signs: Blood pressure 103/51, pulse 61, temperature 99.9  F (37.7  C), temperature source Tympanic, resp. rate 16, height 1.727 m (5' 8\"), weight 90.7 kg (200 lb), SpO2 96%. Patient was oriented to plan of care, call light, bed controls, tv, telephone, bathroom, and visiting hours.     Risk Assessment    The following safety risks were identified during admission: fall and skin. Yellow risk band applied: YES.     Skin Initial Assessment    This writer admitted this patient and completed a full skin assessment and Charles score in the Adult PCS flowsheet. Appropriate interventions initiated as needed.     Secondary skin check completed by ENRICO Mendez.       Education    Patient has a [place] to Observation order: No  Observation education completed and documented: No       Andrea Chang RN      "

## 2024-04-12 NOTE — ED NOTES
Verbal report given to ENRICO Mendez via SBAR report. All questions asked and answered. Patient admitted to to rm 311.

## 2024-04-12 NOTE — H&P
[facility]    History and Physical - Hospitalist Service       Date of Admission:  4/12/2024    Assessment & Plan      Malcolm Delgado is a 89 year old male admitted on 4/12/2024 for a NSTEMI and acute respiratory failure with hypoxia.    Principal Problem:    NSTEMI (non-ST elevated myocardial infarction) (H)  Active Problems:    Comfort measures only status    Chronic atrial fibrillation (H)    Anemia, unspecified type    Prostate cancer (H)    Coronary artery disease involving native coronary artery of native heart    Stage 3a chronic kidney disease (H)    Type 2 diabetes mellitus with kidney complication, without long-term current use of insulin (H)    Acute respiratory failure with hypoxia (H)    Acute respiratory failure with hypoxia due to non-STEMI.  EKG with T wave inversion in inferior and lateral leads compared to December 2023.  Troponin 2661 and BNP 18,948.  Chest x-ray with signs of CHF.  He has listed a preference for comfort measures in the past.  Given chronic anemia with a hemoglobin of 8 adding any medication that increases risk for bleeding will likely also lead to him passing away.  He has multiple other medical issues.  He is comfortable passing away.  Declines intervention.  Social work attempted to arrange hospice back at [facility], but they were unable to obtain oxygen and other supplies today.  He will be admitted placed on oxygen and comfort measures.  Morphine for respiratory distress.  Hold off on usual medications including anticoagulation, diabetes medications, and prostate cancer medications.        Diet: Regular Diet Adult    DVT Prophylaxis: none, comfort measures  Collins Catheter: Not present  Lines: None     Cardiac Monitoring: None  Code Status: No CPR- Do NOT Intubate      Clinically Significant Risk Factors Present on Admission               # Drug Induced Coagulation Defect: home medication list includes an anticoagulant medication    #  "Hypertension: Home medication list includes antihypertensive(s)      # Obesity: Estimated body mass index is 30.41 kg/m  as calculated from the following:    Height as of this encounter: 1.727 m (5' 8\").    Weight as of this encounter: 90.7 kg (200 lb).              Disposition Plan     Medically Ready for Discharge: Anticipated in 2-4 Days           Arnold Weinstein MD  Hospitalist Service  [facility]  Securely message with [employer] (more info)  Text page via [facility] Paging/Directory     ______________________________________________________________________    Chief Complaint   Chest pain, diaphoresis, shortness of breath    History is obtained from the patient and ED provider    History of Present Illness   Malcolm Delgado is a 89 year old male with prostate cancer, anemia, history of CAD, atrial fibs, CKD, diabetes who was brought from [facility] due to chest pain, cough, shortness of breath for 2 days.  Found to have an elevated troponin and BNP consistent with acute non-STEMI.  Given multitude of medical problems and age, patient and family prefer comfort measures.  He is requiring oxygen.  Hospice was not able to arrange all the necessary equipment in order to return home from the ED.  He will be admitted under comfort measures.      Past Medical History    No past medical history on file.    Past Surgical History   No past surgical history on file.    Prior to Admission Medications   Prior to Admission Medications   Prescriptions Last Dose Informant Patient Reported? Taking?   Vitamin D3 (CHOLECALCIFEROL) 25 mcg (1000 units) tablet 4/12/2024 at 0900 custodial Yes Yes   Sig: Take 50 mcg by mouth daily   abiraterone (ZYTIGA) 250 MG tablet 4/12/2024 at 0600 Nursing Home Yes Yes   Sig: Take 1,000 mg by mouth daily   allopurinol (ZYLOPRIM) 100 MG tablet 4/12/2024 at 0900 custodial Yes Yes   Sig: Take 100 mg by mouth daily   apixaban ANTICOAGULANT (ELIQUIS) 5 MG tablet " HELD at HELD (Months)  Yes No   Sig: Take 1 tablet (5 mg) by mouth 2 times daily HOLD this med until follow up with GI and may need scopes   blood glucose (NO BRAND SPECIFIED) test strip NA at [facility] Yes No   Si strip by In Vitro route 3 times daily   calcium citrate (CITRACAL) 950 (200 Ca) MG tablet 2024 at 0900  Yes Yes   Sig: Take 1 tablet by mouth daily   carvedilol (COREG) 6.25 MG tablet 2024 at 0900 [facility] Yes Yes   Sig: Take 6.25 mg by mouth 2 times daily (with meals)   cetirizine (ZYRTEC) 10 MG tablet 2024 at 1900 [facility] Yes Yes   Sig: Take 10 mg by mouth every evening   donepezil (ARICEPT) 5 MG tablet 2024 at 1900 [facility] Yes Yes   Sig: Take 1 tablet by mouth at bedtime   ferrous sulfate (FEROSUL) 325 (65 Fe) MG tablet 2024 at 0900  Yes Yes   Sig: Take 1 tablet by mouth every other day   fluticasone-salmeterol (ADVAIR) 500-50 MCG/ACT inhaler 2024 at 0900 [facility] Yes Yes   Sig: Inhale 1 puff into the lungs 2 times daily Rinse after use   furosemide (LASIX) 20 MG tablet 2024 at 0900 [facility] Yes Yes   Sig: Take 20 mg by mouth daily   glipiZIDE (GLUCOTROL) 5 MG tablet 2024 at 0600 [facility] Home Yes Yes   Sig: Take 2.5 mg by mouth 2 times daily (before meals)   ipratropium - albuterol 0.5 mg/2.5 mg/3 mL (DUONEB) 0.5-2.5 (3) MG/3ML neb solution Past Month at [facility] Yes Yes   Sig: Take 1 vial by nebulization every 6 hours as needed for shortness of breath, wheezing or cough   isosorbide mononitrate (IMDUR) 30 MG 24 hr tablet 2024 at 0900 [facility] Yes Yes   Sig: Take 30 mg by mouth daily   lidocaine (LIDODERM) 5 % patch 2024 at 0900 [facility] Yes Yes   Sig: Place onto the skin every 24 hours To prevent lidocaine toxicity, patient should be patch free for 12 hrs daily.   lisinopril (ZESTRIL) 2.5 MG tablet 2024 at 0900  Yes Yes   Sig: Take 1 tablet by mouth daily   melatonin 5 MG tablet 2024 at 1900  Nursing Home Yes Yes   Sig: Take 5 mg by mouth at bedtime   metFORMIN (GLUCOPHAGE XR) 500 MG 24 hr tablet 4/12/2024 at 0900 [facility] Yes Yes   Sig: Take 1,000 mg by mouth daily   multivitamin  with lutein (OCUVITE WITH LUTEIN) CAPS per capsule 4/12/2024 at 0900 [facility] Yes Yes   Sig: Take 1 tablet by mouth daily   nitroGLYcerin (NITROSTAT) 0.4 MG sublingual tablet Unknown at [facility] Yes Yes   Sig: Place 0.4 mg under the tongue every 5 minutes as needed   pantoprazole (PROTONIX) 40 MG EC tablet 4/12/2024 at 0900  Yes Yes   Sig: Take 40 mg by mouth daily   potassium chloride ER (K-TAB/KLOR-CON) 10 MEQ CR tablet 4/12/2024 at 0900  Yes Yes   Sig: Take 10 mEq by mouth daily   predniSONE (DELTASONE) 5 MG tablet 4/12/2024 at 0600  Yes Yes   Sig: Take 5 mg by mouth daily   sertraline (ZOLOFT) 100 MG tablet 4/12/2024 at 0900 [facility] Yes Yes   Sig: Take 100 mg by mouth daily   tiotropium (SPIRIVA RESPIMAT) 2.5 MCG/ACT inhaler 4/12/2024 at 0900 [facility] Yes Yes   Sig: Inhale 2 puffs into the lungs daily   vitamin C (ASCORBIC ACID) 500 MG tablet 4/12/2024 at 0900  Yes Yes   Sig: Take 1 tablet by mouth daily      Facility-Administered Medications: None      ROS  Decreased appetite, no nausea.  No diarrhea. Denies pain currently.     Physical Exam   Vital Signs: Temp: 98.2  F (36.8  C) Temp src: Tympanic BP: 104/49 Pulse: 99   Resp: 18 SpO2: 93 % O2 Device: Nasal cannula Oxygen Delivery: 2 LPM  Weight: 200 lbs 0 oz    General Appearance: Pleasant, alert, appropriate appearance for age. No acute distress. Wearing O2.  Eyes: EOMI, PERRL, no conjunctival injection  OroPharynx Exam: Normal pharynx.  Neck Exam: Supple, no masses or nodes.  Chest/Respiratory Exam: Decreased breath sounds, crackles in both bases  Cardiovascular Exam: Regular rate and rhythm. S1, S2, no murmur, click, gallop, or rubs.  Gastrointestinal Exam: Soft, nontender, no abnormal masses or organomegaly.  Extremities: No lower extremity  edema.  Neuro Exam: Alert. No significant deficits noted.  Psychiatric: Normal affect      Medical Decision Making             Data     I have personally reviewed the following data over the past 24 hrs:    11.4 (H)  \   8.0 (L)   / 175     138 102 24.6 (H) /  111 (H)   3.7 25 1.27 (H) \     Trop: 2,661 (HH) BNP: 18,948 (H)     INR:  1.22 (H) PTT:  N/A   D-dimer:  N/A Fibrinogen:  N/A       Imaging results reviewed over the past 24 hrs:   Recent Results (from the past 24 hour(s))   XR Chest Port 1 View    Narrative    PROCEDURE:  XR CHEST PORT 1 VIEW    HISTORY: chest pain. .    COMPARISON:  12/27/2023    FINDINGS:    The cardiomediastinal contours are enlarged.  Hazy bibasilar opacities are seen. Interstitial thickening and  vascular cephalization is noted. No pneumothorax is seen.      Impression    IMPRESSION:      Bibasilar infiltrates versus layering small effusions.      Cardiomegaly. Probable interstitial edema.    ARSH HEALY MD         SYSTEM ID:  [id number]

## 2024-04-12 NOTE — PROGRESS NOTES
:    Patient is from [facility] assisted living on the assisted living side.    It was mentioned that patient may need hospice services.    Call placed to [facility] and spoke with ENRICO Fernandes.  She stated she spoke with daughter Lucy and she would like [facility] hospice to be set up for patient.  Dena stated they will need hospice services set up and oxygen ordered in order for patient to return.    Faxed over demographic sheet to [facility] for them to start checking insurance.    Pt/family was given the Medicare Compare list for Hospice, with associated star ratings to assist with choice for referrals/discharge planning Yes    Education was given to pt/family that star ratings are updated/maintained by Medicare and can be reviewed by visiting www.medicare.gov Yes     BRODY Wallis on 4/12/2024 at 2:58 PM       :    Received a call from [facility] stating that they may be able to accept patient back to [facility] with everything set up.  [facility] was given [facility] charge phone number as well as [facility] nurses station.  [facility] on call nurse will call [facility] tomorrow if they can facilitate everything.    BRODY Wallis on 4/12/2024 at 3:56 PM

## 2024-04-12 NOTE — ED TRIAGE NOTES
"Patient reports chest pain for 2-3 days. Productive cough. Requiring o2 at 2 l to maintain 94%./46   Pulse 60   Temp 99.9  F (37.7  C) (Tympanic)   Resp 16   Ht 1.727 m (5' 8\")   Wt 90.7 kg (200 lb)   SpO2 95%   BMI 30.41 kg/m         Triage Assessment (Adult)       Row Name 04/12/24 1306          Triage Assessment    Airway WDL WDL        Respiratory WDL    Respiratory WDL X;cough     Cough Frequency infrequent     Cough Type congested        Skin Circulation/Temperature WDL    Skin Circulation/Temperature WDL WDL        Cardiac WDL    Cardiac WDL chest pain;X        Chest Pain Assessment    Chest Pain Location epigastric     Character aching     Precipitating Factors activity     Alleviating Factors rest        Peripheral/Neurovascular WDL    Peripheral Neurovascular WDL WDL        Cognitive/Neuro/Behavioral WDL    Cognitive/Neuro/Behavioral WDL WDL                     "

## 2024-04-12 NOTE — PHARMACY-ADMISSION MEDICATION HISTORY
Pharmacist Admission Medication History    Admission medication history is complete. The information provided in this note is only as accurate as the sources available at the time of the update.    Information Source(s): Caregiver, Hospital records, and Facility (U/NH/) medication list/MAR via in-person    Pertinent Information: ENRICO Zuñiga at [facility] provided last doses of medications. Eliquis has been on hold for a GI Bleed    Changes made to PTA medication list:  Added: Calcium, iron, lisinopril, pantoprzole, K, vitamin C  Deleted: Dexamethasone (therapy completed)  Changed: Prednisone to once daily, metformin to daily (patient takes in AM, not with supper)    Allergies reviewed with patient and updates made in EHR: yes    Medication History Completed By: Renée Grande [facility] 4/12/2024 5:16 PM    PTA Med List   Medication Sig Last Dose    abiraterone (ZYTIGA) 250 MG tablet Take 1,000 mg by mouth daily 4/12/2024 at 0600    allopurinol (ZYLOPRIM) 100 MG tablet Take 100 mg by mouth daily 4/12/2024 at 0900    calcium citrate (CITRACAL) 950 (200 Ca) MG tablet Take 1 tablet by mouth daily 4/12/2024 at 0900    carvedilol (COREG) 6.25 MG tablet Take 6.25 mg by mouth 2 times daily (with meals) 4/12/2024 at 0900    cetirizine (ZYRTEC) 10 MG tablet Take 10 mg by mouth every evening 4/11/2024 at 1900    donepezil (ARICEPT) 5 MG tablet Take 1 tablet by mouth at bedtime 4/11/2024 at 1900    ferrous sulfate (FEROSUL) 325 (65 Fe) MG tablet Take 1 tablet by mouth every other day 4/12/2024 at 0900    fluticasone-salmeterol (ADVAIR) 500-50 MCG/ACT inhaler Inhale 1 puff into the lungs 2 times daily Rinse after use 4/12/2024 at 0900    furosemide (LASIX) 20 MG tablet Take 20 mg by mouth daily 4/12/2024 at 0900    glipiZIDE (GLUCOTROL) 5 MG tablet Take 2.5 mg by mouth 2 times daily (before meals) 4/12/2024 at 0600    ipratropium - albuterol 0.5 mg/2.5 mg/3 mL (DUONEB) 0.5-2.5 (3) MG/3ML neb solution Take 1 vial by  nebulization every 6 hours as needed for shortness of breath, wheezing or cough Past Month at PRN    isosorbide mononitrate (IMDUR) 30 MG 24 hr tablet Take 30 mg by mouth daily 4/12/2024 at 0900    lidocaine (LIDODERM) 5 % patch Place onto the skin every 24 hours To prevent lidocaine toxicity, patient should be patch free for 12 hrs daily. 4/12/2024 at 0900    lisinopril (ZESTRIL) 2.5 MG tablet Take 1 tablet by mouth daily 4/12/2024 at 0900    melatonin 5 MG tablet Take 5 mg by mouth at bedtime 4/11/2024 at 1900    metFORMIN (GLUCOPHAGE XR) 500 MG 24 hr tablet Take 1,000 mg by mouth daily 4/12/2024 at 0900    multivitamin  with lutein (OCUVITE WITH LUTEIN) CAPS per capsule Take 1 tablet by mouth daily 4/12/2024 at 0900    nitroGLYcerin (NITROSTAT) 0.4 MG sublingual tablet Place 0.4 mg under the tongue every 5 minutes as needed Unknown at PRN    pantoprazole (PROTONIX) 40 MG EC tablet Take 40 mg by mouth daily 4/12/2024 at 0900    potassium chloride ER (K-TAB/KLOR-CON) 10 MEQ CR tablet Take 10 mEq by mouth daily 4/12/2024 at 0900    predniSONE (DELTASONE) 5 MG tablet Take 5 mg by mouth daily 4/12/2024 at 0600    sertraline (ZOLOFT) 100 MG tablet Take 100 mg by mouth daily 4/12/2024 at 0900    tiotropium (SPIRIVA RESPIMAT) 2.5 MCG/ACT inhaler Inhale 2 puffs into the lungs daily 4/12/2024 at 0900    vitamin C (ASCORBIC ACID) 500 MG tablet Take 1 tablet by mouth daily 4/12/2024 at 0900    Vitamin D3 (CHOLECALCIFEROL) 25 mcg (1000 units) tablet Take 50 mcg by mouth daily 4/12/2024 at 0900     ]

## 2024-04-12 NOTE — PLAN OF CARE
"  Problem: Adult Inpatient Plan of Care  Goal: Plan of Care Review  Description: The Plan of Care Review/Shift note should be completed every shift.  The Outcome Evaluation is a brief statement about your assessment that the patient is improving, declining, or no change.  This information will be displayed automatically on your shift  note.  Outcome: Not Progressing  Goal: Patient-Specific Goal (Individualized)  Description: You can add care plan individualizations to a care plan. Examples of Individualization might be:  \"Parent requests to be called daily at 9am for status\", \"I have a hard time hearing out of my right ear\", or \"Do not touch me to wake me up as it startles  me\".  Outcome: Not Progressing  Goal: Absence of Hospital-Acquired Illness or Injury  Outcome: Not Progressing  Intervention: Prevent and Manage VTE (Venous Thromboembolism) Risk  Recent Flowsheet Documentation  Taken 4/12/2024 1702 by Andrea Chang RN  VTE Prevention/Management: SCDs (sequential compression devices) off  Goal: Optimal Comfort and Wellbeing  Outcome: Not Progressing  Intervention: Monitor Pain and Promote Comfort  Recent Flowsheet Documentation  Taken 4/12/2024 1702 by Andrea Chang RN  Pain Management Interventions: medication (see MAR)  Goal: Readiness for Transition of Care  Outcome: Not Progressing  Intervention: Mutually Develop Transition Plan  Recent Flowsheet Documentation  Taken 4/12/2024 1702 by Andrea Chang RN  Equipment Currently Used at Home: walker, rolling     Problem: Skin Injury Risk Increased  Goal: Skin Health and Integrity  Outcome: Not Progressing     Problem: Comorbidity Management  Goal: Maintenance of Asthma Control  Outcome: Not Progressing  Goal: Maintenance of Behavioral Health Symptom Control  Outcome: Not Progressing  Goal: Maintenance of COPD Symptom Control  Outcome: Not Progressing  Goal: Blood Glucose Levels Within Targeted Range  Outcome: Not Progressing  Goal: Maintenance of Heart Failure " Symptom Control  Outcome: Not Progressing  Goal: Blood Pressure in Desired Range  Outcome: Not Progressing  Goal: Maintenance of Osteoarthritis Symptom Control  Outcome: Not Progressing  Goal: Bariatric Home Regimen Maintained  Outcome: Not Progressing  Goal: Maintenance of Seizure Control  Outcome: Not Progressing     Problem: Fall Injury Risk  Goal: Absence of Fall and Fall-Related Injury  Outcome: Not Progressing   Goal Outcome Evaluation:    Temp: 98.2  F (36.8  C) Temp src: Tympanic BP: 104/49 Pulse: 99   Resp: 18 SpO2: 93 % O2 Device: Nasal cannula Oxygen Delivery: 2 LPM

## 2024-04-13 NOTE — PLAN OF CARE
[facility] DISCHARGE NOTE    Patient discharged to assisted living at 12:36 PM via wheel chair. Accompanied by son and daughter and staff. Discharge instructions reviewed with caregiver, opportunity offered to ask questions. Prescriptions sent to patients preferred pharmacy. All belongings sent with patient.      Nessa Parsons RN on 4/13/2024 at 12:37 PM

## 2024-04-13 NOTE — DISCHARGE SUMMARY
"[facility]  Hospitalist Discharge Summary      Date of Admission:  4/12/2024  Date of Discharge:  4/13/2024  Discharging Provider: Arnold Weinstein MD  Discharge Service: Hospitalist Service    Discharge Diagnoses   Principal Problem:    NSTEMI (non-ST elevated myocardial infarction) (H)  Active Problems:    Comfort measures only status    Chronic atrial fibrillation (H)    Anemia, unspecified type    Prostate cancer (H)    Coronary artery disease involving native coronary artery of native heart    Stage 3a chronic kidney disease (H)    Type 2 diabetes mellitus with kidney complication, without long-term current use of insulin (H)    Acute respiratory failure with hypoxia (H)        Clinically Significant Risk Factors     # Obesity: Estimated body mass index is 30.41 kg/m  as calculated from the following:    Height as of this encounter: 1.727 m (5' 8\").    Weight as of this encounter: 90.7 kg (200 lb).       Follow-ups Needed After Discharge   Follow-up Appointments     Follow-up and recommended labs and tests       Follow up through hospice          Unresulted Labs Ordered in the Past 30 Days of this Admission       No orders found for last 31 day(s).        These results will be followed up by NA    Discharge Disposition   Discharged to [facility]   Condition at discharge: Guarded    Hospital Course   Malcolm Delgado is a 89 year old male with prostate cancer, anemia, history of CAD, atrial fibs, CKD, diabetes who was brought from [facility] due to chest pain, cough, shortness of breath for 2 days.  Found to have an elevated troponin of 2661 and BNP of 18,948 consistent with acute non-STEMI and resulting heart failure.  Given multitude of medical problems and age, patient and family prefer comfort measures.  He is comfortable passing away. Requiring oxygen.  Hospice was not able to arrange all the necessary equipment in order to return home from the " ED.  He will be admitted under comfort measures.    Principal Problem:    NSTEMI (non-ST elevated myocardial infarction) (H)  Active Problems:    Comfort measures only status    Chronic atrial fibrillation (H)    Anemia, unspecified type    Prostate cancer (H)    Coronary artery disease involving native coronary artery of native heart    Stage 3a chronic kidney disease (H)    Type 2 diabetes mellitus with kidney complication, without long-term current use of insulin (H)    Acute respiratory failure with hypoxia (H)    Acute respiratory failure with hypoxia due to non-STEMI.  EKG with T wave inversion in inferior and lateral leads compared to December 2023.  Troponin and BNP .  Chest x-ray with signs of CHF.  He has listed a preference for comfort measures in the past.  Given chronic anemia with a hemoglobin of 8 adding any medication that increases risk for bleeding will likely also lead to him passing away.  He has multiple other medical issues.  Declines intervention.    Stable overnight on O2  [facility] was able to arrange oxygen and a hospital bed back at [facility] assisted living, allowing patient to discharge.  Did not require morphine for respiratory distress, but provided prescription on discharge in case of need.    Due to anemia and potential for bleeding, discontinued Eliquis.  Avoid aspirin and clopidogrel.  Blood pressure low.  Discontinued lisinopril, carvedilol, furosemide. Continue Imdur to help with any anginal symptoms.  Stopped Zetia and prednisone for prostate cancer  Stop glipizide and metformin  Continued with donepezil and sertraline to avoid any immediate withdrawal effects or decline in status.  Continue with Advair, Spiriva and DuoNebs  Discussed changes with patient and family. He is likely to pass away in the next couple weeks, so longer term medications are unnecessary. Further medication adjustments per hospice      Consultations This Hospital Stay   SOCIAL WORK IP  CONSULT    Code Status   No CPR- Do NOT Intubate    Time Spent on this Encounter   I, Arnold Weinstein MD, personally saw the patient today and spent greater than 30 minutes discharging this patient.       Arnold Weinstein MD  [facility]  [address]  [place] [address]  Phone: [phone]  Fax: [phone]  ______________________________________________________________________    Physical Exam   Vital Signs: Temp: 98.2  F (36.8  C) Temp src: Tympanic BP: 104/49 Pulse: 99   Resp: 19 SpO2: 98 % O2 Device: Nasal cannula Oxygen Delivery: 3 LPM  Weight: 200 lbs 0 oz  General Appearance: Alert. No acute distress  Chest/Respiratory Exam: Decreased breath sounds both bases, clear upper lobes  Cardiovascular Exam: Regular rate and rhythm. S1, S2, no murmur, gallop, or rubs.  Gastrointestinal Exam: Soft, nontender  Extremities: No lower extremity edema.  Psychiatric: Very pleasant affect         Primary Care Physician   Physician No Ref-Primary    Discharge Orders      Reason for your hospital stay    Heart attack and signs of heart failure     Follow-up and recommended labs and tests     Follow up through hospice     Activity    Your activity upon discharge: activity as tolerated     Discharge Instructions    Use oxygen for comfort     Diet    Follow this diet upon discharge: regular diet       Significant Results and Procedures   Most Recent 3 CBC's:  Recent Labs   Lab Test 04/12/24  1320 12/30/23  0552 12/29/23  1652 12/28/23  1711 12/28/23  0543 12/27/23  1357 12/27/23  0520   WBC 11.4*  --   --   --  9.5  --  14.4*   HGB 8.0* 8.7* 8.7*   < > 8.8*  8.8*   < > 7.7*  7.7*   MCV 85  --   --   --  82  --  83     --   --   --  403  --  410    < > = values in this interval not displayed.     Most Recent 3 BMP's:  Recent Labs   Lab Test 04/12/24  1320 12/30/23  1136 12/30/23  0733 12/30/23  0552 12/29/23  0727 12/29/23  0558     --   --  136  --  137   POTASSIUM 3.7  --    --  4.6  --  4.2   CHLORIDE 102  --   --  101  --  101   CO2 25  --   --  26  --  26   BUN 24.6*  --   --  34.0*  --  32.6*   CR 1.27*  --   --  1.34*  --  1.38*   ANIONGAP 11  --   --  9  --  10   JOSE 8.8  --   --  8.1*  --  7.3*   * 126* 168* 189*   < > 192*    < > = values in this interval not displayed.     Most Recent 2 LFT's:  Recent Labs   Lab Test 12/26/23  0617   AST 32   ALT 17   ALKPHOS 83   BILITOTAL 0.5   ,   Results for orders placed or performed during the hospital encounter of 04/12/24   XR Chest Port 1 View    Narrative    PROCEDURE:  XR CHEST PORT 1 VIEW    HISTORY: chest pain. .    COMPARISON:  12/27/2023    FINDINGS:    The cardiomediastinal contours are enlarged.  Hazy bibasilar opacities are seen. Interstitial thickening and  vascular cephalization is noted. No pneumothorax is seen.      Impression    IMPRESSION:      Bibasilar infiltrates versus layering small effusions.      Cardiomegaly. Probable interstitial edema.    ARSH HEALY MD         SYSTEM ID:  [id number]       Discharge Medications   Current Discharge Medication List        START taking these medications    Details   morphine sulfate HIGH CONCENTRATE (ROXANOL) 10 mg/0.5 mL (HIGH CONC) solution Take 0.25-0.5 mLs (5-10 mg) by mouth every 2 hours as needed for shortness of breath or pain  Qty: 30 mL, Refills: 0    Associated Diagnoses: NSTEMI (non-ST elevated myocardial infarction) (H); Comfort measures only status           CONTINUE these medications which have NOT CHANGED    Details   donepezil (ARICEPT) 5 MG tablet Take 1 tablet by mouth at bedtime      fluticasone-salmeterol (ADVAIR) 500-50 MCG/ACT inhaler Inhale 1 puff into the lungs 2 times daily Rinse after use      ipratropium - albuterol 0.5 mg/2.5 mg/3 mL (DUONEB) 0.5-2.5 (3) MG/3ML neb solution Take 1 vial by nebulization every 6 hours as needed for shortness of breath, wheezing or cough      isosorbide mononitrate (IMDUR) 30 MG 24 hr tablet Take 30 mg by  mouth daily      melatonin 5 MG tablet Take 5 mg by mouth at bedtime      nitroGLYcerin (NITROSTAT) 0.4 MG sublingual tablet Place 0.4 mg under the tongue every 5 minutes as needed      sertraline (ZOLOFT) 100 MG tablet Take 100 mg by mouth daily      tiotropium (SPIRIVA RESPIMAT) 2.5 MCG/ACT inhaler Inhale 2 puffs into the lungs daily      blood glucose (NO BRAND SPECIFIED) test strip 1 strip by In Vitro route 3 times daily           STOP taking these medications       abiraterone (ZYTIGA) 250 MG tablet Comments:   Reason for Stopping:         allopurinol (ZYLOPRIM) 100 MG tablet Comments:   Reason for Stopping:         apixaban ANTICOAGULANT (ELIQUIS) 5 MG tablet Comments:   Reason for Stopping:         calcium citrate (CITRACAL) 950 (200 Ca) MG tablet Comments:   Reason for Stopping:         carvedilol (COREG) 6.25 MG tablet Comments:   Reason for Stopping:         cetirizine (ZYRTEC) 10 MG tablet Comments:   Reason for Stopping:         ferrous sulfate (FEROSUL) 325 (65 Fe) MG tablet Comments:   Reason for Stopping:         furosemide (LASIX) 20 MG tablet Comments:   Reason for Stopping:         glipiZIDE (GLUCOTROL) 5 MG tablet Comments:   Reason for Stopping:         lidocaine (LIDODERM) 5 % patch Comments:   Reason for Stopping:         lisinopril (ZESTRIL) 2.5 MG tablet Comments:   Reason for Stopping:         metFORMIN (GLUCOPHAGE XR) 500 MG 24 hr tablet Comments:   Reason for Stopping:         multivitamin  with lutein (OCUVITE WITH LUTEIN) CAPS per capsule Comments:   Reason for Stopping:         pantoprazole (PROTONIX) 40 MG EC tablet Comments:   Reason for Stopping:         potassium chloride ER (K-TAB/KLOR-CON) 10 MEQ CR tablet Comments:   Reason for Stopping:         predniSONE (DELTASONE) 5 MG tablet Comments:   Reason for Stopping:         vitamin C (ASCORBIC ACID) 500 MG tablet Comments:   Reason for Stopping:         Vitamin D3 (CHOLECALCIFEROL) 25 mcg (1000 units) tablet Comments:   Reason for  Stopping:             Allergies   Allergies   Allergen Reactions    Atorvastatin Angioedema    Diclofenac Diarrhea

## 2024-04-13 NOTE — PLAN OF CARE
Goal Outcome Evaluation:       Alert and oriented x4, used call light appropriately and ambulated to the bathroom as a stand by assist. He denied pain this shift.       At 2030 hrs, the patient stated that he wanted to get up and walk with staff. Staff was walking with the patient when he became irritated and stated that he did not want to walk, he instead wanted to sit in his chair. The patient was assisted into the recliner.     At 0200, the patient woke up and tried to exit the bed. He told staff that he was very warm and needed his gown off. After his gown was removed, he stated that he felt better. He then told staff that he felt like it was hard to catch his breath. His supplimental oxygen was increased to 4 LPM, he stated that it helped. The patient also received PRN 5mg morphine, see MAR.     At 0550 hrs, the patient placed on 3 L of supplemental O2 via nasal cannula.

## 2024-04-13 NOTE — PHARMACY - DISCHARGE MEDICATION RECONCILIATION AND EDUCATION
Pharmacy:  Discharge Counseling and Medication Reconciliation    Malcolm ABDALLA Danny  [address]  [facility] [address]  [phone] (home) [phone] (work)  89 year old male  PCP: No Ref-Primary, Physician    Allergies: Atorvastatin and Diclofenac    Discharge Counseling:    Pharmacist did not meet with patient/family today as patient is discharging to [facility] where all medications will be handled/administered for patient.     Discharge Medication Reconciliation:    It has been determined that the patient has an adequate supply of medications available or which can be obtained from the patient's preferred pharmacy, which HE/SHE has confirmed as: [id number]-> used for acute fill.    Thank you for the consult.    Renée Grande RPH........April 13, 2024 11:09 AM

## 2024-04-13 NOTE — PROGRESS NOTES
Currently on 3L NC.  Breath sounds clear.  1 PRN neb given for shortness of breath.  No change in breath sounds.  Patient stated that he felt a little better after treatment.

## 2024-04-15 NOTE — TELEPHONE ENCOUNTER
Transitional Care Management    Patient discharged with orders for hospice. Hospice was admitting within 48 hours. No TCM call required per policy.   Patient admitted to [facility] on 4/13/2024.  Ana Maria Mercedes RN on 4/15/2024 at 9:17 AM

## (undated) RX ORDER — CEFTRIAXONE SODIUM 1 G
VIAL (EA) INJECTION
Status: DISPENSED
Start: 2023-01-01

## (undated) RX ORDER — LIDOCAINE HYDROCHLORIDE 10 MG/ML
INJECTION, SOLUTION EPIDURAL; INFILTRATION; INTRACAUDAL; PERINEURAL
Status: DISPENSED
Start: 2023-01-01

## (undated) RX ORDER — AZITHROMYCIN 500 MG/5ML
INJECTION, POWDER, LYOPHILIZED, FOR SOLUTION INTRAVENOUS
Status: DISPENSED
Start: 2023-01-01